# Patient Record
Sex: FEMALE | Race: WHITE | NOT HISPANIC OR LATINO
[De-identification: names, ages, dates, MRNs, and addresses within clinical notes are randomized per-mention and may not be internally consistent; named-entity substitution may affect disease eponyms.]

---

## 2019-03-22 ENCOUNTER — TRANSCRIPTION ENCOUNTER (OUTPATIENT)
Age: 29
End: 2019-03-22

## 2021-06-17 ENCOUNTER — RESULT REVIEW (OUTPATIENT)
Age: 31
End: 2021-06-17

## 2021-09-23 ENCOUNTER — APPOINTMENT (OUTPATIENT)
Dept: ANTEPARTUM | Facility: CLINIC | Age: 31
End: 2021-09-23
Payer: COMMERCIAL

## 2021-09-23 ENCOUNTER — ASOB RESULT (OUTPATIENT)
Age: 31
End: 2021-09-23

## 2021-09-23 PROCEDURE — 76805 OB US >/= 14 WKS SNGL FETUS: CPT

## 2021-12-01 ENCOUNTER — APPOINTMENT (OUTPATIENT)
Dept: ANTEPARTUM | Facility: CLINIC | Age: 31
End: 2021-12-01

## 2022-01-05 ENCOUNTER — TRANSCRIPTION ENCOUNTER (OUTPATIENT)
Age: 32
End: 2022-01-05

## 2022-01-05 ENCOUNTER — INPATIENT (INPATIENT)
Facility: HOSPITAL | Age: 32
LOS: 0 days | Discharge: LEFT BEFORE DISCHARGE | End: 2022-01-05
Attending: SPECIALIST | Admitting: SPECIALIST

## 2022-01-05 VITALS
DIASTOLIC BLOOD PRESSURE: 85 MMHG | TEMPERATURE: 98 F | HEART RATE: 84 BPM | SYSTOLIC BLOOD PRESSURE: 133 MMHG | RESPIRATION RATE: 16 BRPM

## 2022-01-05 VITALS — DIASTOLIC BLOOD PRESSURE: 75 MMHG | SYSTOLIC BLOOD PRESSURE: 130 MMHG | HEART RATE: 73 BPM

## 2022-01-05 DIAGNOSIS — O26.899 OTHER SPECIFIED PREGNANCY RELATED CONDITIONS, UNSPECIFIED TRIMESTER: ICD-10-CM

## 2022-01-05 DIAGNOSIS — Z98.1 ARTHRODESIS STATUS: Chronic | ICD-10-CM

## 2022-01-05 DIAGNOSIS — Z98.890 OTHER SPECIFIED POSTPROCEDURAL STATES: Chronic | ICD-10-CM

## 2022-01-05 DIAGNOSIS — Z3A.00 WEEKS OF GESTATION OF PREGNANCY NOT SPECIFIED: ICD-10-CM

## 2022-01-05 LAB
ALBUMIN SERPL ELPH-MCNC: 3.5 G/DL — SIGNIFICANT CHANGE UP (ref 3.3–5)
ALP SERPL-CCNC: 159 U/L — HIGH (ref 40–120)
ALT FLD-CCNC: 16 U/L — SIGNIFICANT CHANGE UP (ref 4–33)
ANION GAP SERPL CALC-SCNC: 12 MMOL/L — SIGNIFICANT CHANGE UP (ref 7–14)
APPEARANCE UR: CLEAR — SIGNIFICANT CHANGE UP
APTT BLD: 24.2 SEC — LOW (ref 27–36.3)
AST SERPL-CCNC: 36 U/L — HIGH (ref 4–32)
BACTERIA # UR AUTO: ABNORMAL
BASOPHILS # BLD AUTO: 0.02 K/UL — SIGNIFICANT CHANGE UP (ref 0–0.2)
BASOPHILS NFR BLD AUTO: 0.2 % — SIGNIFICANT CHANGE UP (ref 0–2)
BILIRUB SERPL-MCNC: <0.2 MG/DL — SIGNIFICANT CHANGE UP (ref 0.2–1.2)
BILIRUB UR-MCNC: NEGATIVE — SIGNIFICANT CHANGE UP
BUN SERPL-MCNC: 9 MG/DL — SIGNIFICANT CHANGE UP (ref 7–23)
CALCIUM SERPL-MCNC: 9.2 MG/DL — SIGNIFICANT CHANGE UP (ref 8.4–10.5)
CHLORIDE SERPL-SCNC: 105 MMOL/L — SIGNIFICANT CHANGE UP (ref 98–107)
CO2 SERPL-SCNC: 21 MMOL/L — LOW (ref 22–31)
COLOR SPEC: YELLOW — SIGNIFICANT CHANGE UP
CREAT ?TM UR-MCNC: 59 MG/DL — SIGNIFICANT CHANGE UP
CREAT SERPL-MCNC: 0.61 MG/DL — SIGNIFICANT CHANGE UP (ref 0.5–1.3)
DIFF PNL FLD: NEGATIVE — SIGNIFICANT CHANGE UP
EOSINOPHIL # BLD AUTO: 0.06 K/UL — SIGNIFICANT CHANGE UP (ref 0–0.5)
EOSINOPHIL NFR BLD AUTO: 0.6 % — SIGNIFICANT CHANGE UP (ref 0–6)
EPI CELLS # UR: 4 /HPF — SIGNIFICANT CHANGE UP (ref 0–5)
FIBRINOGEN PPP-MCNC: 526 MG/DL — HIGH (ref 290–520)
GLUCOSE SERPL-MCNC: 76 MG/DL — SIGNIFICANT CHANGE UP (ref 70–99)
GLUCOSE UR QL: NEGATIVE — SIGNIFICANT CHANGE UP
HCT VFR BLD CALC: 35.3 % — SIGNIFICANT CHANGE UP (ref 34.5–45)
HGB BLD-MCNC: 12.2 G/DL — SIGNIFICANT CHANGE UP (ref 11.5–15.5)
IANC: 8.19 K/UL — SIGNIFICANT CHANGE UP (ref 1.5–8.5)
IMM GRANULOCYTES NFR BLD AUTO: 0.7 % — SIGNIFICANT CHANGE UP (ref 0–1.5)
INR BLD: 0.93 RATIO — SIGNIFICANT CHANGE UP (ref 0.88–1.16)
KETONES UR-MCNC: NEGATIVE — SIGNIFICANT CHANGE UP
LDH SERPL L TO P-CCNC: 452 U/L — HIGH (ref 135–225)
LEUKOCYTE ESTERASE UR-ACNC: ABNORMAL
LYMPHOCYTES # BLD AUTO: 0.68 K/UL — LOW (ref 1–3.3)
LYMPHOCYTES # BLD AUTO: 6.9 % — LOW (ref 13–44)
MCHC RBC-ENTMCNC: 32.4 PG — SIGNIFICANT CHANGE UP (ref 27–34)
MCHC RBC-ENTMCNC: 34.6 GM/DL — SIGNIFICANT CHANGE UP (ref 32–36)
MCV RBC AUTO: 93.6 FL — SIGNIFICANT CHANGE UP (ref 80–100)
MONOCYTES # BLD AUTO: 0.8 K/UL — SIGNIFICANT CHANGE UP (ref 0–0.9)
MONOCYTES NFR BLD AUTO: 8.1 % — SIGNIFICANT CHANGE UP (ref 2–14)
NEUTROPHILS # BLD AUTO: 8.19 K/UL — HIGH (ref 1.8–7.4)
NEUTROPHILS NFR BLD AUTO: 83.5 % — HIGH (ref 43–77)
NITRITE UR-MCNC: NEGATIVE — SIGNIFICANT CHANGE UP
NRBC # BLD: 0 /100 WBCS — SIGNIFICANT CHANGE UP
NRBC # FLD: 0 K/UL — SIGNIFICANT CHANGE UP
PH UR: 6.5 — SIGNIFICANT CHANGE UP (ref 5–8)
PLATELET # BLD AUTO: 142 K/UL — LOW (ref 150–400)
POTASSIUM SERPL-MCNC: 4.9 MMOL/L — SIGNIFICANT CHANGE UP (ref 3.5–5.3)
POTASSIUM SERPL-SCNC: 4.9 MMOL/L — SIGNIFICANT CHANGE UP (ref 3.5–5.3)
PROT ?TM UR-MCNC: 10 MG/DL — SIGNIFICANT CHANGE UP
PROT ?TM UR-MCNC: 10 MG/DL — SIGNIFICANT CHANGE UP
PROT SERPL-MCNC: 6.3 G/DL — SIGNIFICANT CHANGE UP (ref 6–8.3)
PROT UR-MCNC: NEGATIVE — SIGNIFICANT CHANGE UP
PROT/CREAT UR-RTO: 0.2 RATIO — SIGNIFICANT CHANGE UP (ref 0–0.2)
PROTHROM AB SERPL-ACNC: 10.7 SEC — SIGNIFICANT CHANGE UP (ref 10.6–13.6)
RBC # BLD: 3.77 M/UL — LOW (ref 3.8–5.2)
RBC # FLD: 13.2 % — SIGNIFICANT CHANGE UP (ref 10.3–14.5)
RBC CASTS # UR COMP ASSIST: 1 /HPF — SIGNIFICANT CHANGE UP (ref 0–4)
SODIUM SERPL-SCNC: 138 MMOL/L — SIGNIFICANT CHANGE UP (ref 135–145)
SP GR SPEC: 1.02 — SIGNIFICANT CHANGE UP (ref 1–1.05)
URATE SERPL-MCNC: 5.9 MG/DL — SIGNIFICANT CHANGE UP (ref 2.5–7)
UROBILINOGEN FLD QL: SIGNIFICANT CHANGE UP
WBC # BLD: 9.82 K/UL — SIGNIFICANT CHANGE UP (ref 3.8–10.5)
WBC # FLD AUTO: 9.82 K/UL — SIGNIFICANT CHANGE UP (ref 3.8–10.5)
WBC UR QL: 8 /HPF — HIGH (ref 0–5)

## 2022-01-05 NOTE — OB RN TRIAGE NOTE - NSICDXPASTSURGICALHX_GEN_ALL_CORE_FT
PAST SURGICAL HISTORY:  H/O spinal fusion 2004 T5-L3 due to scoliosis    History of bunionectomy 2011 bilateral

## 2022-01-05 NOTE — OB PROVIDER TRIAGE NOTE - NSHPPHYSICALEXAM_GEN_ALL_CORE
BP when pt first arrived - 152/97 and 154/97 (within 1st 4 minutes of arrival) then pt with [id number]'s/70-80's    CX - deferred

## 2022-01-05 NOTE — OB RN TRIAGE NOTE - FALL HARM RISK - UNIVERSAL INTERVENTIONS
Bed in lowest position, wheels locked, appropriate side rails in place/Call bell, personal items and telephone in reach/Instruct patient to call for assistance before getting out of bed or chair/Non-slip footwear when patient is out of bed/[place] to call system/Physically safe environment - no spills, clutter or unnecessary equipment/Purposeful Proactive Rounding/Room/bathroom lighting operational, light cord in reach

## 2022-01-05 NOTE — OB PROVIDER TRIAGE NOTE - NSHPLABSRESULTS_GEN_ALL_CORE
12.2   9.82  )-----------( 142      ( 05 Jan 2022 16:34 )             35.3     01-05    138  |  105  |  9   ----------------------------<  76  4.9   |  21<L>  |  0.61    Ca    9.2      05 Jan 2022 16:34    TPro  6.3  /  Alb  3.5  /  TBili  <0.2  /  DBili  x   /  AST  36<H>  /  ALT  16  /  AlkPhos  159<H>  01-05    P/C ratio - 0.2

## 2022-01-05 NOTE — OB PROVIDER TRIAGE NOTE - NSOBPROVIDERNOTE_OBGYN_ALL_OB_FT
Pt initially had higH BP and pt was about to go home at 3 hour eneida but decision made to do BP monitoring for one more hour.  During hour #3 to #4 pt with increased BP to 150/95; 142/80l 145/84.    Decision was made to admit pt for Induction.  However pt did not wish to stay for Induction as her  just tested positive for Covid yesterday and pt felt better.      FHT - 130's; +variability; Overall FHT Category I    A/P IUP at 37+ weeks with preeclampsia with severe features (due to HA now resolved).  Pt does not wish to stay for induction.  Pt wishes to sign out AMA.    Pt understands risks of signing out including but not limited to bleeding, abruption, seizures, fetal compromise, fetal death and even maternal death.  Pt has verbalized understanding of the above and has signed AMA form.      Pt to do 24 urine as outpt and F/U with PMD next Tuesday.  Labor precautions and pre-eclamptic precautions given   Pt verbalized understanding of the above.  Shavon Choi

## 2022-01-05 NOTE — OB PROVIDER TRIAGE NOTE - HISTORY OF PRESENT ILLNESS
32 y/o  EGA 37+ weeks wtih c/o increased BP in office and also HA.  Pt with of chronic HA.  No BV or LOF. +GoodFM    PNC with PMD(Delfina) with no complcations as per pt.  Pt states she has been getting HAs during the length of the pregnancy (and before)    PMH - not sig  PSH - spinal fusion; bilateral bunionectomy  POb- not sig  Meds - PNV  SH - neg  x 3  All - Chlorhexadine - rash/swelling

## 2022-01-06 DIAGNOSIS — O16.9 UNSPECIFIED MATERNAL HYPERTENSION, UNSPECIFIED TRIMESTER: ICD-10-CM

## 2022-01-08 ENCOUNTER — INPATIENT (INPATIENT)
Facility: HOSPITAL | Age: 32
LOS: 5 days | Discharge: HOME | End: 2022-01-14
Attending: SPECIALIST | Admitting: SPECIALIST

## 2022-01-08 DIAGNOSIS — Z3A.00 WEEKS OF GESTATION OF PREGNANCY NOT SPECIFIED: ICD-10-CM

## 2022-01-08 DIAGNOSIS — Z98.890 OTHER SPECIFIED POSTPROCEDURAL STATES: Chronic | ICD-10-CM

## 2022-01-08 DIAGNOSIS — Z98.1 ARTHRODESIS STATUS: Chronic | ICD-10-CM

## 2022-01-08 DIAGNOSIS — O26.899 OTHER SPECIFIED PREGNANCY RELATED CONDITIONS, UNSPECIFIED TRIMESTER: ICD-10-CM

## 2022-01-09 VITALS — DIASTOLIC BLOOD PRESSURE: 91 MMHG | TEMPERATURE: 98 F | SYSTOLIC BLOOD PRESSURE: 150 MMHG | HEART RATE: 63 BPM

## 2022-01-09 DIAGNOSIS — O14.13 SEVERE PRE-ECLAMPSIA, THIRD TRIMESTER: ICD-10-CM

## 2022-01-09 LAB
ALBUMIN SERPL ELPH-MCNC: 3.2 G/DL — LOW (ref 3.3–5)
ALBUMIN SERPL ELPH-MCNC: 3.3 G/DL — SIGNIFICANT CHANGE UP (ref 3.3–5)
ALBUMIN SERPL ELPH-MCNC: 3.4 G/DL — SIGNIFICANT CHANGE UP (ref 3.3–5)
ALBUMIN SERPL ELPH-MCNC: 3.4 G/DL — SIGNIFICANT CHANGE UP (ref 3.3–5)
ALP SERPL-CCNC: 160 U/L — HIGH (ref 40–120)
ALP SERPL-CCNC: 165 U/L — HIGH (ref 40–120)
ALP SERPL-CCNC: 168 U/L — HIGH (ref 40–120)
ALP SERPL-CCNC: 187 U/L — HIGH (ref 40–120)
ALT FLD-CCNC: 12 U/L — SIGNIFICANT CHANGE UP (ref 4–33)
ALT FLD-CCNC: 13 U/L — SIGNIFICANT CHANGE UP (ref 4–33)
ALT FLD-CCNC: 15 U/L — SIGNIFICANT CHANGE UP (ref 4–33)
ALT FLD-CCNC: 16 U/L — SIGNIFICANT CHANGE UP (ref 4–33)
ANION GAP SERPL CALC-SCNC: 12 MMOL/L — SIGNIFICANT CHANGE UP (ref 7–14)
ANION GAP SERPL CALC-SCNC: 13 MMOL/L — SIGNIFICANT CHANGE UP (ref 7–14)
ANION GAP SERPL CALC-SCNC: 14 MMOL/L — SIGNIFICANT CHANGE UP (ref 7–14)
ANION GAP SERPL CALC-SCNC: 9 MMOL/L — SIGNIFICANT CHANGE UP (ref 7–14)
APPEARANCE UR: ABNORMAL
APTT BLD: 29.9 SEC — SIGNIFICANT CHANGE UP (ref 27–36.3)
APTT BLD: 30.1 SEC — SIGNIFICANT CHANGE UP (ref 27–36.3)
APTT BLD: 31 SEC — SIGNIFICANT CHANGE UP (ref 27–36.3)
APTT BLD: 31.9 SEC — SIGNIFICANT CHANGE UP (ref 27–36.3)
AST SERPL-CCNC: 21 U/L — SIGNIFICANT CHANGE UP (ref 4–32)
AST SERPL-CCNC: 21 U/L — SIGNIFICANT CHANGE UP (ref 4–32)
AST SERPL-CCNC: 24 U/L — SIGNIFICANT CHANGE UP (ref 4–32)
AST SERPL-CCNC: 25 U/L — SIGNIFICANT CHANGE UP (ref 4–32)
BACTERIA # UR AUTO: ABNORMAL
BASOPHILS # BLD AUTO: 0.02 K/UL — SIGNIFICANT CHANGE UP (ref 0–0.2)
BASOPHILS # BLD AUTO: 0.03 K/UL — SIGNIFICANT CHANGE UP (ref 0–0.2)
BASOPHILS NFR BLD AUTO: 0.2 % — SIGNIFICANT CHANGE UP (ref 0–2)
BASOPHILS NFR BLD AUTO: 0.3 % — SIGNIFICANT CHANGE UP (ref 0–2)
BILIRUB SERPL-MCNC: <0.2 MG/DL — SIGNIFICANT CHANGE UP (ref 0.2–1.2)
BILIRUB UR-MCNC: NEGATIVE — SIGNIFICANT CHANGE UP
BLD GP AB SCN SERPL QL: NEGATIVE — SIGNIFICANT CHANGE UP
BUN SERPL-MCNC: 10 MG/DL — SIGNIFICANT CHANGE UP (ref 7–23)
BUN SERPL-MCNC: 5 MG/DL — LOW (ref 7–23)
BUN SERPL-MCNC: 8 MG/DL — SIGNIFICANT CHANGE UP (ref 7–23)
BUN SERPL-MCNC: 9 MG/DL — SIGNIFICANT CHANGE UP (ref 7–23)
CALCIUM SERPL-MCNC: 7.8 MG/DL — LOW (ref 8.4–10.5)
CALCIUM SERPL-MCNC: 7.8 MG/DL — LOW (ref 8.4–10.5)
CALCIUM SERPL-MCNC: 8.4 MG/DL — SIGNIFICANT CHANGE UP (ref 8.4–10.5)
CALCIUM SERPL-MCNC: 9.2 MG/DL — SIGNIFICANT CHANGE UP (ref 8.4–10.5)
CHLORIDE SERPL-SCNC: 101 MMOL/L — SIGNIFICANT CHANGE UP (ref 98–107)
CHLORIDE SERPL-SCNC: 102 MMOL/L — SIGNIFICANT CHANGE UP (ref 98–107)
CHLORIDE SERPL-SCNC: 103 MMOL/L — SIGNIFICANT CHANGE UP (ref 98–107)
CHLORIDE SERPL-SCNC: 104 MMOL/L — SIGNIFICANT CHANGE UP (ref 98–107)
CO2 SERPL-SCNC: 21 MMOL/L — LOW (ref 22–31)
CO2 SERPL-SCNC: 22 MMOL/L — SIGNIFICANT CHANGE UP (ref 22–31)
CO2 SERPL-SCNC: 23 MMOL/L — SIGNIFICANT CHANGE UP (ref 22–31)
CO2 SERPL-SCNC: 23 MMOL/L — SIGNIFICANT CHANGE UP (ref 22–31)
COLOR SPEC: YELLOW — SIGNIFICANT CHANGE UP
COVID-19 SPIKE DOMAIN AB INTERP: POSITIVE
COVID-19 SPIKE DOMAIN ANTIBODY RESULT: >250 U/ML — HIGH
CREAT ?TM UR-MCNC: 109 MG/DL — SIGNIFICANT CHANGE UP
CREAT SERPL-MCNC: 0.65 MG/DL — SIGNIFICANT CHANGE UP (ref 0.5–1.3)
CREAT SERPL-MCNC: 0.68 MG/DL — SIGNIFICANT CHANGE UP (ref 0.5–1.3)
CREAT SERPL-MCNC: 0.68 MG/DL — SIGNIFICANT CHANGE UP (ref 0.5–1.3)
CREAT SERPL-MCNC: 0.71 MG/DL — SIGNIFICANT CHANGE UP (ref 0.5–1.3)
DIFF PNL FLD: NEGATIVE — SIGNIFICANT CHANGE UP
EOSINOPHIL # BLD AUTO: 0.05 K/UL — SIGNIFICANT CHANGE UP (ref 0–0.5)
EOSINOPHIL # BLD AUTO: 0.06 K/UL — SIGNIFICANT CHANGE UP (ref 0–0.5)
EOSINOPHIL # BLD AUTO: 0.06 K/UL — SIGNIFICANT CHANGE UP (ref 0–0.5)
EOSINOPHIL # BLD AUTO: 0.1 K/UL — SIGNIFICANT CHANGE UP (ref 0–0.5)
EOSINOPHIL NFR BLD AUTO: 0.6 % — SIGNIFICANT CHANGE UP (ref 0–6)
EOSINOPHIL NFR BLD AUTO: 0.6 % — SIGNIFICANT CHANGE UP (ref 0–6)
EOSINOPHIL NFR BLD AUTO: 0.7 % — SIGNIFICANT CHANGE UP (ref 0–6)
EOSINOPHIL NFR BLD AUTO: 1 % — SIGNIFICANT CHANGE UP (ref 0–6)
EPI CELLS # UR: 5 /HPF — SIGNIFICANT CHANGE UP (ref 0–5)
FIBRINOGEN PPP-MCNC: 506 MG/DL — SIGNIFICANT CHANGE UP (ref 290–520)
FIBRINOGEN PPP-MCNC: 513 MG/DL — SIGNIFICANT CHANGE UP (ref 290–520)
FIBRINOGEN PPP-MCNC: 515 MG/DL — SIGNIFICANT CHANGE UP (ref 290–520)
FIBRINOGEN PPP-MCNC: 523 MG/DL — HIGH (ref 290–520)
GLUCOSE SERPL-MCNC: 106 MG/DL — HIGH (ref 70–99)
GLUCOSE SERPL-MCNC: 138 MG/DL — HIGH (ref 70–99)
GLUCOSE SERPL-MCNC: 84 MG/DL — SIGNIFICANT CHANGE UP (ref 70–99)
GLUCOSE SERPL-MCNC: 96 MG/DL — SIGNIFICANT CHANGE UP (ref 70–99)
GLUCOSE UR QL: NEGATIVE — SIGNIFICANT CHANGE UP
HCT VFR BLD CALC: 35 % — SIGNIFICANT CHANGE UP (ref 34.5–45)
HCT VFR BLD CALC: 35.2 % — SIGNIFICANT CHANGE UP (ref 34.5–45)
HCT VFR BLD CALC: 35.7 % — SIGNIFICANT CHANGE UP (ref 34.5–45)
HCT VFR BLD CALC: 38.3 % — SIGNIFICANT CHANGE UP (ref 34.5–45)
HGB BLD-MCNC: 11.8 G/DL — SIGNIFICANT CHANGE UP (ref 11.5–15.5)
HGB BLD-MCNC: 12 G/DL — SIGNIFICANT CHANGE UP (ref 11.5–15.5)
HGB BLD-MCNC: 12.1 G/DL — SIGNIFICANT CHANGE UP (ref 11.5–15.5)
HGB BLD-MCNC: 12.8 G/DL — SIGNIFICANT CHANGE UP (ref 11.5–15.5)
HYALINE CASTS # UR AUTO: 2 /LPF — SIGNIFICANT CHANGE UP (ref 0–7)
IANC: 6.38 K/UL — SIGNIFICANT CHANGE UP (ref 1.5–8.5)
IANC: 6.69 K/UL — SIGNIFICANT CHANGE UP (ref 1.5–8.5)
IANC: 6.7 K/UL — SIGNIFICANT CHANGE UP (ref 1.5–8.5)
IANC: 6.71 K/UL — SIGNIFICANT CHANGE UP (ref 1.5–8.5)
IMM GRANULOCYTES NFR BLD AUTO: 0.3 % — SIGNIFICANT CHANGE UP (ref 0–1.5)
IMM GRANULOCYTES NFR BLD AUTO: 0.4 % — SIGNIFICANT CHANGE UP (ref 0–1.5)
IMM GRANULOCYTES NFR BLD AUTO: 0.6 % — SIGNIFICANT CHANGE UP (ref 0–1.5)
IMM GRANULOCYTES NFR BLD AUTO: 0.7 % — SIGNIFICANT CHANGE UP (ref 0–1.5)
INR BLD: 0.9 RATIO — SIGNIFICANT CHANGE UP (ref 0.88–1.16)
INR BLD: 0.91 RATIO — SIGNIFICANT CHANGE UP (ref 0.88–1.16)
INR BLD: 0.94 RATIO — SIGNIFICANT CHANGE UP (ref 0.88–1.16)
INR BLD: <0.9 RATIO — SIGNIFICANT CHANGE UP (ref 0.88–1.16)
KETONES UR-MCNC: NEGATIVE — SIGNIFICANT CHANGE UP
LDH SERPL L TO P-CCNC: 210 U/L — SIGNIFICANT CHANGE UP (ref 135–225)
LDH SERPL L TO P-CCNC: 212 U/L — SIGNIFICANT CHANGE UP (ref 135–225)
LDH SERPL L TO P-CCNC: 231 U/L — HIGH (ref 135–225)
LDH SERPL L TO P-CCNC: 253 U/L — HIGH (ref 135–225)
LEUKOCYTE ESTERASE UR-ACNC: ABNORMAL
LYMPHOCYTES # BLD AUTO: 1.58 K/UL — SIGNIFICANT CHANGE UP (ref 1–3.3)
LYMPHOCYTES # BLD AUTO: 1.66 K/UL — SIGNIFICANT CHANGE UP (ref 1–3.3)
LYMPHOCYTES # BLD AUTO: 17.7 % — SIGNIFICANT CHANGE UP (ref 13–44)
LYMPHOCYTES # BLD AUTO: 18.7 % — SIGNIFICANT CHANGE UP (ref 13–44)
LYMPHOCYTES # BLD AUTO: 2.09 K/UL — SIGNIFICANT CHANGE UP (ref 1–3.3)
LYMPHOCYTES # BLD AUTO: 2.17 K/UL — SIGNIFICANT CHANGE UP (ref 1–3.3)
LYMPHOCYTES # BLD AUTO: 21.6 % — SIGNIFICANT CHANGE UP (ref 13–44)
LYMPHOCYTES # BLD AUTO: 22.8 % — SIGNIFICANT CHANGE UP (ref 13–44)
MAGNESIUM SERPL-MCNC: 4.4 MG/DL — HIGH (ref 1.6–2.6)
MAGNESIUM SERPL-MCNC: 5.2 MG/DL — HIGH (ref 1.6–2.6)
MAGNESIUM SERPL-MCNC: 5.6 MG/DL — HIGH (ref 1.6–2.6)
MCHC RBC-ENTMCNC: 31.2 PG — SIGNIFICANT CHANGE UP (ref 27–34)
MCHC RBC-ENTMCNC: 31.2 PG — SIGNIFICANT CHANGE UP (ref 27–34)
MCHC RBC-ENTMCNC: 31.6 PG — SIGNIFICANT CHANGE UP (ref 27–34)
MCHC RBC-ENTMCNC: 32.1 PG — SIGNIFICANT CHANGE UP (ref 27–34)
MCHC RBC-ENTMCNC: 33.4 GM/DL — SIGNIFICANT CHANGE UP (ref 32–36)
MCHC RBC-ENTMCNC: 33.6 GM/DL — SIGNIFICANT CHANGE UP (ref 32–36)
MCHC RBC-ENTMCNC: 33.7 GM/DL — SIGNIFICANT CHANGE UP (ref 32–36)
MCHC RBC-ENTMCNC: 34.4 GM/DL — SIGNIFICANT CHANGE UP (ref 32–36)
MCV RBC AUTO: 92.6 FL — SIGNIFICANT CHANGE UP (ref 80–100)
MCV RBC AUTO: 92.7 FL — SIGNIFICANT CHANGE UP (ref 80–100)
MCV RBC AUTO: 93.4 FL — SIGNIFICANT CHANGE UP (ref 80–100)
MCV RBC AUTO: 94.6 FL — SIGNIFICANT CHANGE UP (ref 80–100)
MONOCYTES # BLD AUTO: 0.39 K/UL — SIGNIFICANT CHANGE UP (ref 0–0.9)
MONOCYTES # BLD AUTO: 0.55 K/UL — SIGNIFICANT CHANGE UP (ref 0–0.9)
MONOCYTES # BLD AUTO: 0.77 K/UL — SIGNIFICANT CHANGE UP (ref 0–0.9)
MONOCYTES # BLD AUTO: 0.79 K/UL — SIGNIFICANT CHANGE UP (ref 0–0.9)
MONOCYTES NFR BLD AUTO: 4.4 % — SIGNIFICANT CHANGE UP (ref 2–14)
MONOCYTES NFR BLD AUTO: 6.1 % — SIGNIFICANT CHANGE UP (ref 2–14)
MONOCYTES NFR BLD AUTO: 8 % — SIGNIFICANT CHANGE UP (ref 2–14)
MONOCYTES NFR BLD AUTO: 8.3 % — SIGNIFICANT CHANGE UP (ref 2–14)
NEUTROPHILS # BLD AUTO: 6.38 K/UL — SIGNIFICANT CHANGE UP (ref 1.8–7.4)
NEUTROPHILS # BLD AUTO: 6.69 K/UL — SIGNIFICANT CHANGE UP (ref 1.8–7.4)
NEUTROPHILS # BLD AUTO: 6.7 K/UL — SIGNIFICANT CHANGE UP (ref 1.8–7.4)
NEUTROPHILS # BLD AUTO: 6.71 K/UL — SIGNIFICANT CHANGE UP (ref 1.8–7.4)
NEUTROPHILS NFR BLD AUTO: 67 % — SIGNIFICANT CHANGE UP (ref 43–77)
NEUTROPHILS NFR BLD AUTO: 69.3 % — SIGNIFICANT CHANGE UP (ref 43–77)
NEUTROPHILS NFR BLD AUTO: 75 % — SIGNIFICANT CHANGE UP (ref 43–77)
NEUTROPHILS NFR BLD AUTO: 75.3 % — SIGNIFICANT CHANGE UP (ref 43–77)
NITRITE UR-MCNC: NEGATIVE — SIGNIFICANT CHANGE UP
NRBC # BLD: 0 /100 WBCS — SIGNIFICANT CHANGE UP
NRBC # FLD: 0 K/UL — SIGNIFICANT CHANGE UP
PH UR: 6 — SIGNIFICANT CHANGE UP (ref 5–8)
PLATELET # BLD AUTO: 163 K/UL — SIGNIFICANT CHANGE UP (ref 150–400)
PLATELET # BLD AUTO: 176 K/UL — SIGNIFICANT CHANGE UP (ref 150–400)
PLATELET # BLD AUTO: 179 K/UL — SIGNIFICANT CHANGE UP (ref 150–400)
PLATELET # BLD AUTO: 190 K/UL — SIGNIFICANT CHANGE UP (ref 150–400)
POTASSIUM SERPL-MCNC: 3.5 MMOL/L — SIGNIFICANT CHANGE UP (ref 3.5–5.3)
POTASSIUM SERPL-MCNC: 3.7 MMOL/L — SIGNIFICANT CHANGE UP (ref 3.5–5.3)
POTASSIUM SERPL-MCNC: 3.8 MMOL/L — SIGNIFICANT CHANGE UP (ref 3.5–5.3)
POTASSIUM SERPL-MCNC: 3.8 MMOL/L — SIGNIFICANT CHANGE UP (ref 3.5–5.3)
POTASSIUM SERPL-SCNC: 3.5 MMOL/L — SIGNIFICANT CHANGE UP (ref 3.5–5.3)
POTASSIUM SERPL-SCNC: 3.7 MMOL/L — SIGNIFICANT CHANGE UP (ref 3.5–5.3)
POTASSIUM SERPL-SCNC: 3.8 MMOL/L — SIGNIFICANT CHANGE UP (ref 3.5–5.3)
POTASSIUM SERPL-SCNC: 3.8 MMOL/L — SIGNIFICANT CHANGE UP (ref 3.5–5.3)
PROT ?TM UR-MCNC: 22 MG/DL — SIGNIFICANT CHANGE UP
PROT ?TM UR-MCNC: 22 MG/DL — SIGNIFICANT CHANGE UP
PROT SERPL-MCNC: 6 G/DL — SIGNIFICANT CHANGE UP (ref 6–8.3)
PROT SERPL-MCNC: 6 G/DL — SIGNIFICANT CHANGE UP (ref 6–8.3)
PROT SERPL-MCNC: 6.2 G/DL — SIGNIFICANT CHANGE UP (ref 6–8.3)
PROT SERPL-MCNC: 6.2 G/DL — SIGNIFICANT CHANGE UP (ref 6–8.3)
PROT UR-MCNC: ABNORMAL
PROT/CREAT UR-RTO: 0.2 RATIO — SIGNIFICANT CHANGE UP (ref 0–0.2)
PROTHROM AB SERPL-ACNC: 10.2 SEC — LOW (ref 10.6–13.6)
PROTHROM AB SERPL-ACNC: 10.3 SEC — LOW (ref 10.6–13.6)
PROTHROM AB SERPL-ACNC: 10.4 SEC — LOW (ref 10.6–13.6)
PROTHROM AB SERPL-ACNC: 10.8 SEC — SIGNIFICANT CHANGE UP (ref 10.6–13.6)
RBC # BLD: 3.77 M/UL — LOW (ref 3.8–5.2)
RBC # BLD: 3.78 M/UL — LOW (ref 3.8–5.2)
RBC # BLD: 3.85 M/UL — SIGNIFICANT CHANGE UP (ref 3.8–5.2)
RBC # BLD: 4.05 M/UL — SIGNIFICANT CHANGE UP (ref 3.8–5.2)
RBC # FLD: 13.5 % — SIGNIFICANT CHANGE UP (ref 10.3–14.5)
RBC # FLD: 13.5 % — SIGNIFICANT CHANGE UP (ref 10.3–14.5)
RBC # FLD: 13.7 % — SIGNIFICANT CHANGE UP (ref 10.3–14.5)
RBC # FLD: 13.7 % — SIGNIFICANT CHANGE UP (ref 10.3–14.5)
RBC CASTS # UR COMP ASSIST: <5 /HPF — SIGNIFICANT CHANGE UP (ref 0–4)
RH IG SCN BLD-IMP: POSITIVE — SIGNIFICANT CHANGE UP
RH IG SCN BLD-IMP: POSITIVE — SIGNIFICANT CHANGE UP
SARS-COV-2 IGG+IGM SERPL QL IA: >250 U/ML — HIGH
SARS-COV-2 IGG+IGM SERPL QL IA: POSITIVE
SARS-COV-2 RNA SPEC QL NAA+PROBE: DETECTED
SODIUM SERPL-SCNC: 135 MMOL/L — SIGNIFICANT CHANGE UP (ref 135–145)
SODIUM SERPL-SCNC: 136 MMOL/L — SIGNIFICANT CHANGE UP (ref 135–145)
SODIUM SERPL-SCNC: 137 MMOL/L — SIGNIFICANT CHANGE UP (ref 135–145)
SODIUM SERPL-SCNC: 139 MMOL/L — SIGNIFICANT CHANGE UP (ref 135–145)
SP GR SPEC: 1.02 — SIGNIFICANT CHANGE UP (ref 1–1.05)
URATE SERPL-MCNC: 5.8 MG/DL — SIGNIFICANT CHANGE UP (ref 2.5–7)
URATE SERPL-MCNC: 6 MG/DL — SIGNIFICANT CHANGE UP (ref 2.5–7)
URATE SERPL-MCNC: 6.1 MG/DL — SIGNIFICANT CHANGE UP (ref 2.5–7)
URATE SERPL-MCNC: 6.4 MG/DL — SIGNIFICANT CHANGE UP (ref 2.5–7)
UROBILINOGEN FLD QL: SIGNIFICANT CHANGE UP
WBC # BLD: 8.89 K/UL — SIGNIFICANT CHANGE UP (ref 3.8–10.5)
WBC # BLD: 8.95 K/UL — SIGNIFICANT CHANGE UP (ref 3.8–10.5)
WBC # BLD: 9.53 K/UL — SIGNIFICANT CHANGE UP (ref 3.8–10.5)
WBC # BLD: 9.66 K/UL — SIGNIFICANT CHANGE UP (ref 3.8–10.5)
WBC # FLD AUTO: 8.89 K/UL — SIGNIFICANT CHANGE UP (ref 3.8–10.5)
WBC # FLD AUTO: 8.95 K/UL — SIGNIFICANT CHANGE UP (ref 3.8–10.5)
WBC # FLD AUTO: 9.53 K/UL — SIGNIFICANT CHANGE UP (ref 3.8–10.5)
WBC # FLD AUTO: 9.66 K/UL — SIGNIFICANT CHANGE UP (ref 3.8–10.5)
WBC UR QL: >50 /HPF — SIGNIFICANT CHANGE UP (ref 0–5)

## 2022-01-09 RX ORDER — AMPICILLIN TRIHYDRATE 250 MG
1 CAPSULE ORAL EVERY 4 HOURS
Refills: 0 | Status: DISCONTINUED | OUTPATIENT
Start: 2022-01-09 | End: 2022-01-12

## 2022-01-09 RX ORDER — OXYTOCIN 10 UNIT/ML
333.33 VIAL (ML) INJECTION
Qty: 20 | Refills: 0 | Status: DISCONTINUED | OUTPATIENT
Start: 2022-01-09 | End: 2022-01-13

## 2022-01-09 RX ORDER — ACETAMINOPHEN 500 MG
0 TABLET ORAL
Qty: 0 | Refills: 0 | DISCHARGE

## 2022-01-09 RX ORDER — ACETAMINOPHEN 500 MG
1000 TABLET ORAL ONCE
Refills: 0 | Status: COMPLETED | OUTPATIENT
Start: 2022-01-09 | End: 2022-01-09

## 2022-01-09 RX ORDER — DIPHENHYDRAMINE HCL 50 MG
25 CAPSULE ORAL ONCE
Refills: 0 | Status: DISCONTINUED | OUTPATIENT
Start: 2022-01-09 | End: 2022-01-12

## 2022-01-09 RX ORDER — MAGNESIUM SULFATE 500 MG/ML
4 VIAL (ML) INJECTION ONCE
Refills: 0 | Status: COMPLETED | OUTPATIENT
Start: 2022-01-09 | End: 2022-01-09

## 2022-01-09 RX ORDER — MAGNESIUM SULFATE 500 MG/ML
4 VIAL (ML) INJECTION ONCE
Refills: 0 | Status: DISCONTINUED | OUTPATIENT
Start: 2022-01-09 | End: 2022-01-09

## 2022-01-09 RX ORDER — DIPHENHYDRAMINE HCL 50 MG
25 CAPSULE ORAL ONCE
Refills: 0 | Status: COMPLETED | OUTPATIENT
Start: 2022-01-09 | End: 2022-01-09

## 2022-01-09 RX ORDER — AMPICILLIN TRIHYDRATE 250 MG
2 CAPSULE ORAL ONCE
Refills: 0 | Status: COMPLETED | OUTPATIENT
Start: 2022-01-09 | End: 2022-01-09

## 2022-01-09 RX ORDER — METOCLOPRAMIDE HCL 10 MG
10 TABLET ORAL ONCE
Refills: 0 | Status: COMPLETED | OUTPATIENT
Start: 2022-01-09 | End: 2022-01-09

## 2022-01-09 RX ORDER — SODIUM CHLORIDE 9 MG/ML
1000 INJECTION, SOLUTION INTRAVENOUS
Refills: 0 | Status: DISCONTINUED | OUTPATIENT
Start: 2022-01-09 | End: 2022-01-12

## 2022-01-09 RX ORDER — MAGNESIUM SULFATE 500 MG/ML
2 VIAL (ML) INJECTION
Qty: 40 | Refills: 0 | Status: DISCONTINUED | OUTPATIENT
Start: 2022-01-09 | End: 2022-01-09

## 2022-01-09 RX ORDER — HYDRALAZINE HCL 50 MG
5 TABLET ORAL ONCE
Refills: 0 | Status: COMPLETED | OUTPATIENT
Start: 2022-01-09 | End: 2022-01-09

## 2022-01-09 RX ORDER — BENZOCAINE AND MENTHOL 5; 1 G/100ML; G/100ML
1 LIQUID ORAL
Refills: 0 | Status: DISCONTINUED | OUTPATIENT
Start: 2022-01-09 | End: 2022-01-12

## 2022-01-09 RX ORDER — MAGNESIUM SULFATE 500 MG/ML
2 VIAL (ML) INJECTION
Qty: 40 | Refills: 0 | Status: COMPLETED | OUTPATIENT
Start: 2022-01-09 | End: 2022-01-10

## 2022-01-09 RX ADMIN — Medication 216 GRAM(S): at 05:03

## 2022-01-09 RX ADMIN — Medication 108 GRAM(S): at 13:08

## 2022-01-09 RX ADMIN — Medication 50 GM/HR: at 19:14

## 2022-01-09 RX ADMIN — Medication 10 MILLIGRAM(S): at 16:03

## 2022-01-09 RX ADMIN — Medication 400 MILLIGRAM(S): at 14:08

## 2022-01-09 RX ADMIN — Medication 50 GM/HR: at 02:36

## 2022-01-09 RX ADMIN — Medication 108 GRAM(S): at 09:10

## 2022-01-09 RX ADMIN — Medication 108 GRAM(S): at 21:00

## 2022-01-09 RX ADMIN — Medication 300 GRAM(S): at 02:07

## 2022-01-09 RX ADMIN — Medication 5 MILLIGRAM(S): at 01:50

## 2022-01-09 RX ADMIN — Medication 108 GRAM(S): at 17:12

## 2022-01-09 RX ADMIN — Medication 25 MILLIGRAM(S): at 16:03

## 2022-01-09 RX ADMIN — Medication 1000 MILLIGRAM(S): at 14:24

## 2022-01-09 NOTE — OB PROVIDER H&P - NSHPPHYSICALEXAM_GEN_ALL_CORE
Vital Signs Last 24 Hrs  T(C): 36.8 (09 Jan 2022 00:51), Max: 36.8 (09 Jan 2022 00:50)  T(F): 98.2 (09 Jan 2022 00:51), Max: 98.24 (09 Jan 2022 00:50)  HR: 64 (09 Jan 2022 02:05) (61 - 70)  BP: 137/83 (09 Jan 2022 02:05) (136/84 - 164/91)  BP(mean): --  RR: 16 (09 Jan 2022 00:51) (16 - 16)  SpO2: --    Lungs: Anterior and posterior lung sounds clear upon auscultation  Cardiac: R/R/R  SVE: closed/50/-3  TAS: vertex presentation   EFW 3317gm by leopold's     NST reactive with moderate variability, cat 1  toco no ctx noted

## 2022-01-09 NOTE — OB PROVIDER H&P - ASSESSMENT
0120:  BP-162/91, HR 70   repeat BP- 164/91, HR 69  d/w Dr Licona   Plan:  -STAT 5mg Hydralazine IVP  -will continue to monitor  -Admit for IOL PO cytotec and magnesium sulfate     32 y/o pt 38.3 weeks  presents with PEC without severe features   d/w Dr Agustin and Dr Licona  Plan:  -Admit l&d. Routine labs  -IOL PO cytotec  -PEC labs pending   -Magnesium sulfate for neuro protection  -Procardia 30mg XL daily   -Fetus: cat 1, vertex presentation, continuous monitoring  -Ampicillin for gbs prophylaxis  -Pain: patient denies pain management at this time   -Covid 19 pending for patient and support person  -Consents signed and witnessed at bedside

## 2022-01-09 NOTE — OB PROVIDER LABOR PROGRESS NOTE - ASSESSMENT
-attempt made at placing cervical balloon unsuccessful  -continue IOL w/ cytotec  -reattempt when cervix more favorable  -category 1 tracing  -headache w/ sPEC- given tylenol, reglan, benadryl    d/w Dr. Dougie Nieto PGY3

## 2022-01-09 NOTE — OB PROVIDER H&P - HISTORY OF PRESENT ILLNESS
32 y/o pt 38.3 weeks  with known gestational hypertension was advised to come to triage by primary OB for further evaluation of PEC due to elevated BPs in office and incomplete evaluation on 22 in triage. pt denies any headache, visual disturbances or epigastric pain. pt denies any lof, contractions or bleeding. pt denies n/v/d, fever or chills. pt endorses +Fetal movement   AP complicated by:  -Patient was sent from office for elevated BPs with HA and evaluated in triage on 22; pt recommended to be admitted for IOL for elevated BPs and abnormal labs. pt refused IOL and signed out AMA     NKDA  PMH:   Scoliosis   PSH:  Spinal fusion 04' (T5-L3), as per patient, not cleared by anesthesia for epidural  B/L bunionectomy 11'  OB: denies  GYN:  denies fibroids/cysts/STDs  Social/psych hx:  denies ETOH/smoking/illicit drugs   denies psych hx   Medications:  PNV

## 2022-01-09 NOTE — OB PROVIDER H&P - PROBLEM SELECTOR PLAN 1
-Admit l&d. Routine labs  -IOL PO cytotec  -PEC labs pending   -Magnesium sulfate for neuro protection  -Procardia 30mg XL daily   -Fetus: cat 1, vertex presentation, continuous monitoring  -Ampicillin for gbs prophylaxis  -Pain: patient denies pain management at this time   -Covid 19 pending for patient and support person  -Consents signed and witnessed at bedside

## 2022-01-09 NOTE — OB RN PATIENT PROFILE - AS HEIGHT TYPE
Patient complains of chest pain after waking up this morning.  Patient states pain is on the right side stated

## 2022-01-09 NOTE — OB RN PATIENT PROFILE - FALL HARM RISK - UNIVERSAL INTERVENTIONS
Bed in lowest position, wheels locked, appropriate side rails in place/Call bell, personal items and telephone in reach/Instruct patient to call for assistance before getting out of bed or chair/Non-slip footwear when patient is out of bed/[place] to call system/Physically safe environment - no spills, clutter or unnecessary equipment/Purposeful Proactive Rounding/Room/bathroom lighting operational, light cord in reach

## 2022-01-09 NOTE — OB RN TRIAGE NOTE - FALL HARM RISK - UNIVERSAL INTERVENTIONS
Bed in lowest position, wheels locked, appropriate side rails in place/Call bell, personal items and telephone in reach/Instruct patient to call for assistance before getting out of bed or chair/Non-slip footwear when patient is out of bed/[place] to call system/Physically safe environment - no spills, clutter or unnecessary equipment/Purposeful Proactive Rounding/Room/bathroom lighting operational, light cord in reach

## 2022-01-09 NOTE — OB RN PATIENT PROFILE - PRO HBSAG INFANT
negative Complex Repair And Skin Substitute Graft Text: The defect edges were debeveled with a #15 scalpel blade.  The primary defect was closed partially with a complex linear closure.  Given the location of the remaining defect, shape of the defect and the proximity to free margins a skin substitute graft was deemed most appropriate to repair the remaining defect.  The graft was trimmed to fit the size of the remaining defect.  The graft was then placed in the primary defect, oriented appropriately, and sutured into place.

## 2022-01-09 NOTE — OB RN PATIENT PROFILE - TOBACCO USE
Never smoker Skin Substitute Text: The defect edges were debeveled with a #15 scalpel blade.  Given the location of the defect, shape of the defect and the proximity to free margins a skin substitute graft was deemed most appropriate.  The graft material was trimmed to fit the size of the defect. The graft was then placed in the primary defect and oriented appropriately.

## 2022-01-10 ENCOUNTER — TRANSCRIPTION ENCOUNTER (OUTPATIENT)
Age: 32
End: 2022-01-10

## 2022-01-10 DIAGNOSIS — O16.9 UNSPECIFIED MATERNAL HYPERTENSION, UNSPECIFIED TRIMESTER: ICD-10-CM

## 2022-01-10 LAB
ALBUMIN SERPL ELPH-MCNC: 3.4 G/DL — SIGNIFICANT CHANGE UP (ref 3.3–5)
ALBUMIN SERPL ELPH-MCNC: 3.4 G/DL — SIGNIFICANT CHANGE UP (ref 3.3–5)
ALBUMIN SERPL ELPH-MCNC: 3.6 G/DL — SIGNIFICANT CHANGE UP (ref 3.3–5)
ALBUMIN SERPL ELPH-MCNC: 3.6 G/DL — SIGNIFICANT CHANGE UP (ref 3.3–5)
ALP SERPL-CCNC: 174 U/L — HIGH (ref 40–120)
ALP SERPL-CCNC: 185 U/L — HIGH (ref 40–120)
ALP SERPL-CCNC: 190 U/L — HIGH (ref 40–120)
ALP SERPL-CCNC: 204 U/L — HIGH (ref 40–120)
ALT FLD-CCNC: 13 U/L — SIGNIFICANT CHANGE UP (ref 4–33)
ALT FLD-CCNC: 16 U/L — SIGNIFICANT CHANGE UP (ref 4–33)
ALT FLD-CCNC: 17 U/L — SIGNIFICANT CHANGE UP (ref 4–33)
ALT FLD-CCNC: 22 U/L — SIGNIFICANT CHANGE UP (ref 4–33)
ANION GAP SERPL CALC-SCNC: 10 MMOL/L — SIGNIFICANT CHANGE UP (ref 7–14)
ANION GAP SERPL CALC-SCNC: 11 MMOL/L — SIGNIFICANT CHANGE UP (ref 7–14)
ANION GAP SERPL CALC-SCNC: 12 MMOL/L — SIGNIFICANT CHANGE UP (ref 7–14)
ANION GAP SERPL CALC-SCNC: 13 MMOL/L — SIGNIFICANT CHANGE UP (ref 7–14)
APTT BLD: 31.2 SEC — SIGNIFICANT CHANGE UP (ref 27–36.3)
APTT BLD: 31.2 SEC — SIGNIFICANT CHANGE UP (ref 27–36.3)
APTT BLD: 31.3 SEC — SIGNIFICANT CHANGE UP (ref 27–36.3)
APTT BLD: 31.8 SEC — SIGNIFICANT CHANGE UP (ref 27–36.3)
AST SERPL-CCNC: 22 U/L — SIGNIFICANT CHANGE UP (ref 4–32)
AST SERPL-CCNC: 24 U/L — SIGNIFICANT CHANGE UP (ref 4–32)
AST SERPL-CCNC: 25 U/L — SIGNIFICANT CHANGE UP (ref 4–32)
AST SERPL-CCNC: 31 U/L — SIGNIFICANT CHANGE UP (ref 4–32)
BASOPHILS # BLD AUTO: 0.02 K/UL — SIGNIFICANT CHANGE UP (ref 0–0.2)
BASOPHILS # BLD AUTO: 0.02 K/UL — SIGNIFICANT CHANGE UP (ref 0–0.2)
BASOPHILS # BLD AUTO: 0.03 K/UL — SIGNIFICANT CHANGE UP (ref 0–0.2)
BASOPHILS # BLD AUTO: 0.03 K/UL — SIGNIFICANT CHANGE UP (ref 0–0.2)
BASOPHILS NFR BLD AUTO: 0.2 % — SIGNIFICANT CHANGE UP (ref 0–2)
BASOPHILS NFR BLD AUTO: 0.2 % — SIGNIFICANT CHANGE UP (ref 0–2)
BASOPHILS NFR BLD AUTO: 0.3 % — SIGNIFICANT CHANGE UP (ref 0–2)
BASOPHILS NFR BLD AUTO: 0.3 % — SIGNIFICANT CHANGE UP (ref 0–2)
BILIRUB SERPL-MCNC: 0.2 MG/DL — SIGNIFICANT CHANGE UP (ref 0.2–1.2)
BILIRUB SERPL-MCNC: <0.2 MG/DL — SIGNIFICANT CHANGE UP (ref 0.2–1.2)
BUN SERPL-MCNC: 3 MG/DL — LOW (ref 7–23)
BUN SERPL-MCNC: 4 MG/DL — LOW (ref 7–23)
BUN SERPL-MCNC: 6 MG/DL — LOW (ref 7–23)
BUN SERPL-MCNC: 6 MG/DL — LOW (ref 7–23)
CALCIUM SERPL-MCNC: 7.5 MG/DL — LOW (ref 8.4–10.5)
CALCIUM SERPL-MCNC: 7.6 MG/DL — LOW (ref 8.4–10.5)
CALCIUM SERPL-MCNC: 7.8 MG/DL — LOW (ref 8.4–10.5)
CALCIUM SERPL-MCNC: 8.1 MG/DL — LOW (ref 8.4–10.5)
CHLORIDE SERPL-SCNC: 100 MMOL/L — SIGNIFICANT CHANGE UP (ref 98–107)
CHLORIDE SERPL-SCNC: 100 MMOL/L — SIGNIFICANT CHANGE UP (ref 98–107)
CHLORIDE SERPL-SCNC: 101 MMOL/L — SIGNIFICANT CHANGE UP (ref 98–107)
CHLORIDE SERPL-SCNC: 102 MMOL/L — SIGNIFICANT CHANGE UP (ref 98–107)
CO2 SERPL-SCNC: 23 MMOL/L — SIGNIFICANT CHANGE UP (ref 22–31)
CREAT SERPL-MCNC: 0.71 MG/DL — SIGNIFICANT CHANGE UP (ref 0.5–1.3)
CREAT SERPL-MCNC: 0.74 MG/DL — SIGNIFICANT CHANGE UP (ref 0.5–1.3)
CREAT SERPL-MCNC: 0.75 MG/DL — SIGNIFICANT CHANGE UP (ref 0.5–1.3)
CREAT SERPL-MCNC: 0.81 MG/DL — SIGNIFICANT CHANGE UP (ref 0.5–1.3)
EOSINOPHIL # BLD AUTO: 0.03 K/UL — SIGNIFICANT CHANGE UP (ref 0–0.5)
EOSINOPHIL # BLD AUTO: 0.04 K/UL — SIGNIFICANT CHANGE UP (ref 0–0.5)
EOSINOPHIL # BLD AUTO: 0.06 K/UL — SIGNIFICANT CHANGE UP (ref 0–0.5)
EOSINOPHIL # BLD AUTO: 0.09 K/UL — SIGNIFICANT CHANGE UP (ref 0–0.5)
EOSINOPHIL NFR BLD AUTO: 0.3 % — SIGNIFICANT CHANGE UP (ref 0–6)
EOSINOPHIL NFR BLD AUTO: 0.3 % — SIGNIFICANT CHANGE UP (ref 0–6)
EOSINOPHIL NFR BLD AUTO: 0.5 % — SIGNIFICANT CHANGE UP (ref 0–6)
EOSINOPHIL NFR BLD AUTO: 0.8 % — SIGNIFICANT CHANGE UP (ref 0–6)
FIBRINOGEN PPP-MCNC: 480 MG/DL — SIGNIFICANT CHANGE UP (ref 290–520)
FIBRINOGEN PPP-MCNC: 484 MG/DL — SIGNIFICANT CHANGE UP (ref 290–520)
FIBRINOGEN PPP-MCNC: 579 MG/DL — HIGH (ref 290–520)
GLUCOSE SERPL-MCNC: 104 MG/DL — HIGH (ref 70–99)
GLUCOSE SERPL-MCNC: 104 MG/DL — HIGH (ref 70–99)
GLUCOSE SERPL-MCNC: 83 MG/DL — SIGNIFICANT CHANGE UP (ref 70–99)
GLUCOSE SERPL-MCNC: 84 MG/DL — SIGNIFICANT CHANGE UP (ref 70–99)
HCT VFR BLD CALC: 35.9 % — SIGNIFICANT CHANGE UP (ref 34.5–45)
HCT VFR BLD CALC: 36 % — SIGNIFICANT CHANGE UP (ref 34.5–45)
HCT VFR BLD CALC: 38.6 % — SIGNIFICANT CHANGE UP (ref 34.5–45)
HCT VFR BLD CALC: 40.3 % — SIGNIFICANT CHANGE UP (ref 34.5–45)
HGB BLD-MCNC: 12.2 G/DL — SIGNIFICANT CHANGE UP (ref 11.5–15.5)
HGB BLD-MCNC: 12.5 G/DL — SIGNIFICANT CHANGE UP (ref 11.5–15.5)
HGB BLD-MCNC: 13.1 G/DL — SIGNIFICANT CHANGE UP (ref 11.5–15.5)
HGB BLD-MCNC: 13.2 G/DL — SIGNIFICANT CHANGE UP (ref 11.5–15.5)
IANC: 8.08 K/UL — SIGNIFICANT CHANGE UP (ref 1.5–8.5)
IANC: 8.91 K/UL — HIGH (ref 1.5–8.5)
IANC: 9.23 K/UL — HIGH (ref 1.5–8.5)
IANC: 9.36 K/UL — HIGH (ref 1.5–8.5)
IMM GRANULOCYTES NFR BLD AUTO: 0.3 % — SIGNIFICANT CHANGE UP (ref 0–1.5)
IMM GRANULOCYTES NFR BLD AUTO: 0.3 % — SIGNIFICANT CHANGE UP (ref 0–1.5)
IMM GRANULOCYTES NFR BLD AUTO: 0.5 % — SIGNIFICANT CHANGE UP (ref 0–1.5)
IMM GRANULOCYTES NFR BLD AUTO: 0.5 % — SIGNIFICANT CHANGE UP (ref 0–1.5)
INR BLD: 0.9 RATIO — SIGNIFICANT CHANGE UP (ref 0.88–1.16)
INR BLD: 0.92 RATIO — SIGNIFICANT CHANGE UP (ref 0.88–1.16)
INR BLD: <0.9 RATIO — SIGNIFICANT CHANGE UP (ref 0.88–1.16)
LDH SERPL L TO P-CCNC: 223 U/L — SIGNIFICANT CHANGE UP (ref 135–225)
LDH SERPL L TO P-CCNC: 228 U/L — HIGH (ref 135–225)
LDH SERPL L TO P-CCNC: 238 U/L — HIGH (ref 135–225)
LDH SERPL L TO P-CCNC: 262 U/L — HIGH (ref 135–225)
LYMPHOCYTES # BLD AUTO: 1.3 K/UL — SIGNIFICANT CHANGE UP (ref 1–3.3)
LYMPHOCYTES # BLD AUTO: 1.76 K/UL — SIGNIFICANT CHANGE UP (ref 1–3.3)
LYMPHOCYTES # BLD AUTO: 1.82 K/UL — SIGNIFICANT CHANGE UP (ref 1–3.3)
LYMPHOCYTES # BLD AUTO: 1.89 K/UL — SIGNIFICANT CHANGE UP (ref 1–3.3)
LYMPHOCYTES # BLD AUTO: 11.7 % — LOW (ref 13–44)
LYMPHOCYTES # BLD AUTO: 14.8 % — SIGNIFICANT CHANGE UP (ref 13–44)
LYMPHOCYTES # BLD AUTO: 15.7 % — SIGNIFICANT CHANGE UP (ref 13–44)
LYMPHOCYTES # BLD AUTO: 17.4 % — SIGNIFICANT CHANGE UP (ref 13–44)
MAGNESIUM SERPL-MCNC: 5.5 MG/DL — HIGH (ref 1.6–2.6)
MAGNESIUM SERPL-MCNC: 6.1 MG/DL — HIGH (ref 1.6–2.6)
MAGNESIUM SERPL-MCNC: 6.5 MG/DL — HIGH (ref 1.6–2.6)
MAGNESIUM SERPL-MCNC: 6.9 MG/DL — HIGH (ref 1.6–2.6)
MCHC RBC-ENTMCNC: 31.2 PG — SIGNIFICANT CHANGE UP (ref 27–34)
MCHC RBC-ENTMCNC: 31.7 PG — SIGNIFICANT CHANGE UP (ref 27–34)
MCHC RBC-ENTMCNC: 31.8 PG — SIGNIFICANT CHANGE UP (ref 27–34)
MCHC RBC-ENTMCNC: 32.3 PG — SIGNIFICANT CHANGE UP (ref 27–34)
MCHC RBC-ENTMCNC: 32.8 GM/DL — SIGNIFICANT CHANGE UP (ref 32–36)
MCHC RBC-ENTMCNC: 33.9 GM/DL — SIGNIFICANT CHANGE UP (ref 32–36)
MCHC RBC-ENTMCNC: 33.9 GM/DL — SIGNIFICANT CHANGE UP (ref 32–36)
MCHC RBC-ENTMCNC: 34.8 GM/DL — SIGNIFICANT CHANGE UP (ref 32–36)
MCV RBC AUTO: 92.8 FL — SIGNIFICANT CHANGE UP (ref 80–100)
MCV RBC AUTO: 93.5 FL — SIGNIFICANT CHANGE UP (ref 80–100)
MCV RBC AUTO: 93.8 FL — SIGNIFICANT CHANGE UP (ref 80–100)
MCV RBC AUTO: 95.3 FL — SIGNIFICANT CHANGE UP (ref 80–100)
MONOCYTES # BLD AUTO: 0.5 K/UL — SIGNIFICANT CHANGE UP (ref 0–0.9)
MONOCYTES # BLD AUTO: 0.67 K/UL — SIGNIFICANT CHANGE UP (ref 0–0.9)
MONOCYTES # BLD AUTO: 0.74 K/UL — SIGNIFICANT CHANGE UP (ref 0–0.9)
MONOCYTES # BLD AUTO: 0.74 K/UL — SIGNIFICANT CHANGE UP (ref 0–0.9)
MONOCYTES NFR BLD AUTO: 4.5 % — SIGNIFICANT CHANGE UP (ref 2–14)
MONOCYTES NFR BLD AUTO: 5.6 % — SIGNIFICANT CHANGE UP (ref 2–14)
MONOCYTES NFR BLD AUTO: 6.4 % — SIGNIFICANT CHANGE UP (ref 2–14)
MONOCYTES NFR BLD AUTO: 6.8 % — SIGNIFICANT CHANGE UP (ref 2–14)
NEUTROPHILS # BLD AUTO: 8.08 K/UL — HIGH (ref 1.8–7.4)
NEUTROPHILS # BLD AUTO: 8.91 K/UL — HIGH (ref 1.8–7.4)
NEUTROPHILS # BLD AUTO: 9.23 K/UL — HIGH (ref 1.8–7.4)
NEUTROPHILS # BLD AUTO: 9.36 K/UL — HIGH (ref 1.8–7.4)
NEUTROPHILS NFR BLD AUTO: 74.4 % — SIGNIFICANT CHANGE UP (ref 43–77)
NEUTROPHILS NFR BLD AUTO: 76.7 % — SIGNIFICANT CHANGE UP (ref 43–77)
NEUTROPHILS NFR BLD AUTO: 78.8 % — HIGH (ref 43–77)
NEUTROPHILS NFR BLD AUTO: 82.7 % — HIGH (ref 43–77)
NRBC # BLD: 0 /100 WBCS — SIGNIFICANT CHANGE UP
NRBC # FLD: 0 K/UL — SIGNIFICANT CHANGE UP
PLATELET # BLD AUTO: 185 K/UL — SIGNIFICANT CHANGE UP (ref 150–400)
PLATELET # BLD AUTO: 202 K/UL — SIGNIFICANT CHANGE UP (ref 150–400)
PLATELET # BLD AUTO: 211 K/UL — SIGNIFICANT CHANGE UP (ref 150–400)
PLATELET # BLD AUTO: 222 K/UL — SIGNIFICANT CHANGE UP (ref 150–400)
POTASSIUM SERPL-MCNC: 3.5 MMOL/L — SIGNIFICANT CHANGE UP (ref 3.5–5.3)
POTASSIUM SERPL-MCNC: 3.8 MMOL/L — SIGNIFICANT CHANGE UP (ref 3.5–5.3)
POTASSIUM SERPL-MCNC: 3.9 MMOL/L — SIGNIFICANT CHANGE UP (ref 3.5–5.3)
POTASSIUM SERPL-MCNC: 4 MMOL/L — SIGNIFICANT CHANGE UP (ref 3.5–5.3)
POTASSIUM SERPL-SCNC: 3.5 MMOL/L — SIGNIFICANT CHANGE UP (ref 3.5–5.3)
POTASSIUM SERPL-SCNC: 3.8 MMOL/L — SIGNIFICANT CHANGE UP (ref 3.5–5.3)
POTASSIUM SERPL-SCNC: 3.9 MMOL/L — SIGNIFICANT CHANGE UP (ref 3.5–5.3)
POTASSIUM SERPL-SCNC: 4 MMOL/L — SIGNIFICANT CHANGE UP (ref 3.5–5.3)
PROT SERPL-MCNC: 6.2 G/DL — SIGNIFICANT CHANGE UP (ref 6–8.3)
PROT SERPL-MCNC: 6.4 G/DL — SIGNIFICANT CHANGE UP (ref 6–8.3)
PROT SERPL-MCNC: 6.8 G/DL — SIGNIFICANT CHANGE UP (ref 6–8.3)
PROT SERPL-MCNC: 6.9 G/DL — SIGNIFICANT CHANGE UP (ref 6–8.3)
PROTHROM AB SERPL-ACNC: 10.3 SEC — LOW (ref 10.6–13.6)
PROTHROM AB SERPL-ACNC: 10.3 SEC — LOW (ref 10.6–13.6)
PROTHROM AB SERPL-ACNC: 10.6 SEC — SIGNIFICANT CHANGE UP (ref 10.6–13.6)
RBC # BLD: 3.84 M/UL — SIGNIFICANT CHANGE UP (ref 3.8–5.2)
RBC # BLD: 3.87 M/UL — SIGNIFICANT CHANGE UP (ref 3.8–5.2)
RBC # BLD: 4.13 M/UL — SIGNIFICANT CHANGE UP (ref 3.8–5.2)
RBC # BLD: 4.23 M/UL — SIGNIFICANT CHANGE UP (ref 3.8–5.2)
RBC # FLD: 13.5 % — SIGNIFICANT CHANGE UP (ref 10.3–14.5)
RBC # FLD: 13.7 % — SIGNIFICANT CHANGE UP (ref 10.3–14.5)
RBC # FLD: 13.8 % — SIGNIFICANT CHANGE UP (ref 10.3–14.5)
RBC # FLD: 13.8 % — SIGNIFICANT CHANGE UP (ref 10.3–14.5)
SODIUM SERPL-SCNC: 134 MMOL/L — LOW (ref 135–145)
SODIUM SERPL-SCNC: 135 MMOL/L — SIGNIFICANT CHANGE UP (ref 135–145)
SODIUM SERPL-SCNC: 136 MMOL/L — SIGNIFICANT CHANGE UP (ref 135–145)
SODIUM SERPL-SCNC: 136 MMOL/L — SIGNIFICANT CHANGE UP (ref 135–145)
T PALLIDUM AB TITR SER: NEGATIVE — SIGNIFICANT CHANGE UP
URATE SERPL-MCNC: 6.5 MG/DL — SIGNIFICANT CHANGE UP (ref 2.5–7)
URATE SERPL-MCNC: 7.3 MG/DL — HIGH (ref 2.5–7)
URATE SERPL-MCNC: 7.6 MG/DL — HIGH (ref 2.5–7)
WBC # BLD: 10.86 K/UL — HIGH (ref 3.8–10.5)
WBC # BLD: 11.15 K/UL — HIGH (ref 3.8–10.5)
WBC # BLD: 11.61 K/UL — HIGH (ref 3.8–10.5)
WBC # BLD: 11.89 K/UL — HIGH (ref 3.8–10.5)
WBC # FLD AUTO: 10.86 K/UL — HIGH (ref 3.8–10.5)
WBC # FLD AUTO: 11.15 K/UL — HIGH (ref 3.8–10.5)
WBC # FLD AUTO: 11.61 K/UL — HIGH (ref 3.8–10.5)
WBC # FLD AUTO: 11.89 K/UL — HIGH (ref 3.8–10.5)

## 2022-01-10 RX ORDER — BUTORPHANOL TARTRATE 2 MG/ML
2 INJECTION, SOLUTION INTRAMUSCULAR; INTRAVENOUS ONCE
Refills: 0 | Status: DISCONTINUED | OUTPATIENT
Start: 2022-01-10 | End: 2022-01-10

## 2022-01-10 RX ORDER — MAGNESIUM SULFATE 500 MG/ML
1.5 VIAL (ML) INJECTION
Qty: 40 | Refills: 0 | Status: DISCONTINUED | OUTPATIENT
Start: 2022-01-10 | End: 2022-01-12

## 2022-01-10 RX ORDER — ACETAMINOPHEN 500 MG
650 TABLET ORAL ONCE
Refills: 0 | Status: COMPLETED | OUTPATIENT
Start: 2022-01-10 | End: 2022-01-10

## 2022-01-10 RX ORDER — ACETAMINOPHEN 500 MG
975 TABLET ORAL ONCE
Refills: 0 | Status: COMPLETED | OUTPATIENT
Start: 2022-01-10 | End: 2022-01-10

## 2022-01-10 RX ORDER — MAGNESIUM SULFATE 500 MG/ML
1.5 VIAL (ML) INJECTION
Qty: 40 | Refills: 0 | Status: COMPLETED | OUTPATIENT
Start: 2022-01-10 | End: 2022-01-11

## 2022-01-10 RX ORDER — NIFEDIPINE 30 MG
1 TABLET, EXTENDED RELEASE 24 HR ORAL
Qty: 0 | Refills: 0 | DISCHARGE
Start: 2022-01-10

## 2022-01-10 RX ORDER — METOCLOPRAMIDE HCL 10 MG
10 TABLET ORAL ONCE
Refills: 0 | Status: COMPLETED | OUTPATIENT
Start: 2022-01-10 | End: 2022-01-10

## 2022-01-10 RX ORDER — DIPHENHYDRAMINE HCL 50 MG
25 CAPSULE ORAL ONCE
Refills: 0 | Status: COMPLETED | OUTPATIENT
Start: 2022-01-10 | End: 2022-01-10

## 2022-01-10 RX ORDER — NIFEDIPINE 30 MG
30 TABLET, EXTENDED RELEASE 24 HR ORAL DAILY
Refills: 0 | Status: DISCONTINUED | OUTPATIENT
Start: 2022-01-10 | End: 2022-01-12

## 2022-01-10 RX ADMIN — SODIUM CHLORIDE 125 MILLILITER(S): 9 INJECTION, SOLUTION INTRAVENOUS at 19:17

## 2022-01-10 RX ADMIN — BUTORPHANOL TARTRATE 2 MILLIGRAM(S): 2 INJECTION, SOLUTION INTRAMUSCULAR; INTRAVENOUS at 11:20

## 2022-01-10 RX ADMIN — Medication 108 GRAM(S): at 13:18

## 2022-01-10 RX ADMIN — Medication 25 MILLIGRAM(S): at 16:58

## 2022-01-10 RX ADMIN — Medication 37.5 GM/HR: at 19:32

## 2022-01-10 RX ADMIN — Medication 10 MILLIGRAM(S): at 16:57

## 2022-01-10 RX ADMIN — Medication 50 GM/HR: at 15:53

## 2022-01-10 RX ADMIN — Medication 108 GRAM(S): at 01:01

## 2022-01-10 RX ADMIN — Medication 108 GRAM(S): at 05:01

## 2022-01-10 RX ADMIN — Medication 108 GRAM(S): at 21:00

## 2022-01-10 RX ADMIN — BUTORPHANOL TARTRATE 2 MILLIGRAM(S): 2 INJECTION, SOLUTION INTRAMUSCULAR; INTRAVENOUS at 11:03

## 2022-01-10 RX ADMIN — Medication 50 GM/HR: at 07:09

## 2022-01-10 RX ADMIN — Medication 650 MILLIGRAM(S): at 09:15

## 2022-01-10 RX ADMIN — Medication 975 MILLIGRAM(S): at 00:54

## 2022-01-10 RX ADMIN — Medication 30 MILLIGRAM(S): at 05:15

## 2022-01-10 RX ADMIN — Medication 650 MILLIGRAM(S): at 08:56

## 2022-01-10 RX ADMIN — Medication 108 GRAM(S): at 08:57

## 2022-01-10 RX ADMIN — Medication 108 GRAM(S): at 16:56

## 2022-01-10 RX ADMIN — Medication 975 MILLIGRAM(S): at 00:12

## 2022-01-10 NOTE — OB PROVIDER LABOR PROGRESS NOTE - ASSESSMENT
32yo  @38+4 IOL for sPEC    - SVE: 0.5/30/-3 posterior.   - CB attempted and unsuccessful  - c/w po cytotec  - FHT Cat 1, reassuring, ctm    KELLIE Gonzales, PGY1

## 2022-01-10 NOTE — DISCHARGE NOTE OB - MEDICATION SUMMARY - MEDICATIONS TO TAKE
I will START or STAY ON the medications listed below when I get home from the hospital:    NIFEdipine 30 mg oral tablet, extended release  -- 1 tab(s) by mouth once a day  -- Indication: For BP    Prena1 oral capsule  -- 1 cap(s) by mouth once a day  -- Indication: For Pregnancy   I will START or STAY ON the medications listed below when I get home from the hospital:    Tylenol 325 mg oral tablet  -- 3 tab(s) by mouth every 6 hours, As Needed  -- Indication: For PAIN    Motrin 600 mg oral tablet  -- 1 tab(s) by mouth every 6 hours, As Needed  -- Indication: For PAIN    NIFEdipine 30 mg oral tablet, extended release  -- 1 tab(s) by mouth once a day  -- Indication: For BP    Prena1 oral capsule  -- 1 cap(s) by mouth once a day  -- Indication: For Vitamins

## 2022-01-10 NOTE — OB PROVIDER LABOR PROGRESS NOTE - ASSESSMENT
30 yo  @38+4 IOL for sPEC    - CB still in place  - VC#4 placed at 23:13  - T cat 1, reassuring, rene Gonzales, PGY1

## 2022-01-10 NOTE — OB PROVIDER LABOR PROGRESS NOTE - ASSESSMENT
Plan:  -Con't IOL with VC  -CB in place  -Con't EFM, toco, IVF  -Pt not requiring analgesia at this time, no epi 2/2 scoliosis    D/w Dr. Armas PGY-4  CHINEDU Gupta PGY-1 Plan: 32 yo G1 IOL sPEC by BPs on magnesium, Procardia 30 XL s/p hydral 5 (1/9)  -Con't IOL with VC  -CB in place  -Con't EFM, toco, IVF  -Pt not requiring analgesia at this time, no epi 2/2 scoliosis    D/w Dr. Armas PGY-4  CHINEDU Gupta PGY-1

## 2022-01-10 NOTE — DISCHARGE NOTE OB - CARE PROVIDER_API CALL
Frances Mathis)  Obstetrics and Gynecology  [address] [place] [address]  Phone: [phone]  Fax: [phone]  Follow Up Time: 1-3 days

## 2022-01-10 NOTE — CHART NOTE - NSCHARTNOTEFT_GEN_A_CORE
ob attending    pt seen earlier-- co slight headache  vss  fht 110 accels no decels moderate variability  toco irreg  a/p cat 1 fht  vag cytotec  cervical balloon  discussed with anesthesia-- spinal in the even of  section is plan but stadol and PCA for labor  patient now telling anesthesia sister with history of malignant hyperthermia  I discussed with patient baby stable and FHT normal--   magnesium discussed  continue induction as plan is vaginal delivery    now sp stadol variability min to moderate    Jung PEREZ

## 2022-01-10 NOTE — OB PROVIDER LABOR PROGRESS NOTE - ASSESSMENT
-cervical balloon successfully placed  -vaginal cytotec #1 placed at 10a  -category 1 tracing  -c/w magnesium sulfate for seizure ppx    Dr. Mahtis aware  MGreenman PGY3

## 2022-01-10 NOTE — DISCHARGE NOTE OB - NS MD DC FALL RISK RISK
For information on Fall & Injury Prevention, visit: [email or web address] OR  [email or web address] OR  https://www.cdc.gov/steadi/patient.html

## 2022-01-10 NOTE — DISCHARGE NOTE OB - MATERIALS PROVIDED
Vaccinations/[facility]  Screening Program/  Immunization Record/Breastfeeding Log/Breastfeeding Mother’s Support Group Information/Guide to Postpartum Care/[facility] Hearing Screen Program/Shaken Baby Prevention Handout/Birth Certificate Instructions/Discharge Medication Information for Patients and Families Pocket Guide

## 2022-01-10 NOTE — OB PROVIDER LABOR PROGRESS NOTE - ASSESSMENT
-VC#2 placed  -balloon in place  -category 1 tracing  -s/p nandini    d/w Dr. Mathis  [facility] PGY3

## 2022-01-10 NOTE — DISCHARGE NOTE OB - PATIENT PORTAL LINK FT
You can access the FollowMyHealth Patient Portal offered by [facility] by registering at the following website: [email or web address]. By joining [employer]’s FollowMyHealth portal, you will also be able to view your health information using other applications (apps) compatible with our system.

## 2022-01-10 NOTE — DISCHARGE NOTE OB - CARE PLAN
Assessment and plan of treatment:	regular   1 Principal Discharge DX:	 delivery delivered  Assessment and plan of treatment:	Stable  continue supportive care

## 2022-01-11 ENCOUNTER — TRANSCRIPTION ENCOUNTER (OUTPATIENT)
Age: 32
End: 2022-01-11

## 2022-01-11 LAB
ALBUMIN SERPL ELPH-MCNC: 3.1 G/DL — LOW (ref 3.3–5)
ALBUMIN SERPL ELPH-MCNC: 3.2 G/DL — LOW (ref 3.3–5)
ALBUMIN SERPL ELPH-MCNC: 3.4 G/DL — SIGNIFICANT CHANGE UP (ref 3.3–5)
ALBUMIN SERPL ELPH-MCNC: 3.4 G/DL — SIGNIFICANT CHANGE UP (ref 3.3–5)
ALP SERPL-CCNC: 177 U/L — HIGH (ref 40–120)
ALP SERPL-CCNC: 184 U/L — HIGH (ref 40–120)
ALP SERPL-CCNC: 196 U/L — HIGH (ref 40–120)
ALP SERPL-CCNC: 201 U/L — HIGH (ref 40–120)
ALT FLD-CCNC: 13 U/L — SIGNIFICANT CHANGE UP (ref 4–33)
ALT FLD-CCNC: 16 U/L — SIGNIFICANT CHANGE UP (ref 4–33)
ALT FLD-CCNC: 18 U/L — SIGNIFICANT CHANGE UP (ref 4–33)
ALT FLD-CCNC: 20 U/L — SIGNIFICANT CHANGE UP (ref 4–33)
ANION GAP SERPL CALC-SCNC: 12 MMOL/L — SIGNIFICANT CHANGE UP (ref 7–14)
ANION GAP SERPL CALC-SCNC: 12 MMOL/L — SIGNIFICANT CHANGE UP (ref 7–14)
ANION GAP SERPL CALC-SCNC: 13 MMOL/L — SIGNIFICANT CHANGE UP (ref 7–14)
ANION GAP SERPL CALC-SCNC: 14 MMOL/L — SIGNIFICANT CHANGE UP (ref 7–14)
APTT BLD: 29.7 SEC — SIGNIFICANT CHANGE UP (ref 27–36.3)
APTT BLD: 30.1 SEC — SIGNIFICANT CHANGE UP (ref 27–36.3)
APTT BLD: 30.7 SEC — SIGNIFICANT CHANGE UP (ref 27–36.3)
AST SERPL-CCNC: 25 U/L — SIGNIFICANT CHANGE UP (ref 4–32)
AST SERPL-CCNC: 28 U/L — SIGNIFICANT CHANGE UP (ref 4–32)
BASOPHILS # BLD AUTO: 0.02 K/UL — SIGNIFICANT CHANGE UP (ref 0–0.2)
BASOPHILS # BLD AUTO: 0.03 K/UL — SIGNIFICANT CHANGE UP (ref 0–0.2)
BASOPHILS # BLD AUTO: 0.03 K/UL — SIGNIFICANT CHANGE UP (ref 0–0.2)
BASOPHILS # BLD AUTO: 0.04 K/UL — SIGNIFICANT CHANGE UP (ref 0–0.2)
BASOPHILS NFR BLD AUTO: 0.2 % — SIGNIFICANT CHANGE UP (ref 0–2)
BASOPHILS NFR BLD AUTO: 0.2 % — SIGNIFICANT CHANGE UP (ref 0–2)
BASOPHILS NFR BLD AUTO: 0.3 % — SIGNIFICANT CHANGE UP (ref 0–2)
BASOPHILS NFR BLD AUTO: 0.4 % — SIGNIFICANT CHANGE UP (ref 0–2)
BILIRUB SERPL-MCNC: 0.2 MG/DL — SIGNIFICANT CHANGE UP (ref 0.2–1.2)
BILIRUB SERPL-MCNC: 0.3 MG/DL — SIGNIFICANT CHANGE UP (ref 0.2–1.2)
BUN SERPL-MCNC: 4 MG/DL — LOW (ref 7–23)
BUN SERPL-MCNC: 5 MG/DL — LOW (ref 7–23)
BUN SERPL-MCNC: 5 MG/DL — LOW (ref 7–23)
BUN SERPL-MCNC: 6 MG/DL — LOW (ref 7–23)
CALCIUM SERPL-MCNC: 7.7 MG/DL — LOW (ref 8.4–10.5)
CALCIUM SERPL-MCNC: 7.8 MG/DL — LOW (ref 8.4–10.5)
CALCIUM SERPL-MCNC: 7.9 MG/DL — LOW (ref 8.4–10.5)
CALCIUM SERPL-MCNC: 8 MG/DL — LOW (ref 8.4–10.5)
CHLORIDE SERPL-SCNC: 100 MMOL/L — SIGNIFICANT CHANGE UP (ref 98–107)
CHLORIDE SERPL-SCNC: 100 MMOL/L — SIGNIFICANT CHANGE UP (ref 98–107)
CHLORIDE SERPL-SCNC: 98 MMOL/L — SIGNIFICANT CHANGE UP (ref 98–107)
CHLORIDE SERPL-SCNC: 99 MMOL/L — SIGNIFICANT CHANGE UP (ref 98–107)
CO2 SERPL-SCNC: 23 MMOL/L — SIGNIFICANT CHANGE UP (ref 22–31)
CO2 SERPL-SCNC: 24 MMOL/L — SIGNIFICANT CHANGE UP (ref 22–31)
CREAT SERPL-MCNC: 0.74 MG/DL — SIGNIFICANT CHANGE UP (ref 0.5–1.3)
CREAT SERPL-MCNC: 0.76 MG/DL — SIGNIFICANT CHANGE UP (ref 0.5–1.3)
CREAT SERPL-MCNC: 0.76 MG/DL — SIGNIFICANT CHANGE UP (ref 0.5–1.3)
CREAT SERPL-MCNC: 0.78 MG/DL — SIGNIFICANT CHANGE UP (ref 0.5–1.3)
EOSINOPHIL # BLD AUTO: 0.03 K/UL — SIGNIFICANT CHANGE UP (ref 0–0.5)
EOSINOPHIL # BLD AUTO: 0.03 K/UL — SIGNIFICANT CHANGE UP (ref 0–0.5)
EOSINOPHIL # BLD AUTO: 0.05 K/UL — SIGNIFICANT CHANGE UP (ref 0–0.5)
EOSINOPHIL # BLD AUTO: 0.07 K/UL — SIGNIFICANT CHANGE UP (ref 0–0.5)
EOSINOPHIL NFR BLD AUTO: 0.2 % — SIGNIFICANT CHANGE UP (ref 0–6)
EOSINOPHIL NFR BLD AUTO: 0.3 % — SIGNIFICANT CHANGE UP (ref 0–6)
EOSINOPHIL NFR BLD AUTO: 0.5 % — SIGNIFICANT CHANGE UP (ref 0–6)
EOSINOPHIL NFR BLD AUTO: 0.8 % — SIGNIFICANT CHANGE UP (ref 0–6)
FIBRINOGEN PPP-MCNC: 523 MG/DL — HIGH (ref 290–520)
FIBRINOGEN PPP-MCNC: 535 MG/DL — HIGH (ref 290–520)
FIBRINOGEN PPP-MCNC: 555 MG/DL — HIGH (ref 290–520)
GLUCOSE SERPL-MCNC: 114 MG/DL — HIGH (ref 70–99)
GLUCOSE SERPL-MCNC: 82 MG/DL — SIGNIFICANT CHANGE UP (ref 70–99)
GLUCOSE SERPL-MCNC: 83 MG/DL — SIGNIFICANT CHANGE UP (ref 70–99)
GLUCOSE SERPL-MCNC: 83 MG/DL — SIGNIFICANT CHANGE UP (ref 70–99)
HCT VFR BLD CALC: 36.3 % — SIGNIFICANT CHANGE UP (ref 34.5–45)
HCT VFR BLD CALC: 39.2 % — SIGNIFICANT CHANGE UP (ref 34.5–45)
HCT VFR BLD CALC: 39.9 % — SIGNIFICANT CHANGE UP (ref 34.5–45)
HCT VFR BLD CALC: 40.4 % — SIGNIFICANT CHANGE UP (ref 34.5–45)
HGB BLD-MCNC: 12.4 G/DL — SIGNIFICANT CHANGE UP (ref 11.5–15.5)
HGB BLD-MCNC: 12.6 G/DL — SIGNIFICANT CHANGE UP (ref 11.5–15.5)
HGB BLD-MCNC: 12.9 G/DL — SIGNIFICANT CHANGE UP (ref 11.5–15.5)
HGB BLD-MCNC: 13.6 G/DL — SIGNIFICANT CHANGE UP (ref 11.5–15.5)
IANC: 7.09 K/UL — SIGNIFICANT CHANGE UP (ref 1.5–8.5)
IANC: 7.46 K/UL — SIGNIFICANT CHANGE UP (ref 1.5–8.5)
IANC: 7.47 K/UL — SIGNIFICANT CHANGE UP (ref 1.5–8.5)
IANC: 9.55 K/UL — HIGH (ref 1.5–8.5)
IMM GRANULOCYTES NFR BLD AUTO: 0.3 % — SIGNIFICANT CHANGE UP (ref 0–1.5)
IMM GRANULOCYTES NFR BLD AUTO: 0.5 % — SIGNIFICANT CHANGE UP (ref 0–1.5)
IMM GRANULOCYTES NFR BLD AUTO: 0.5 % — SIGNIFICANT CHANGE UP (ref 0–1.5)
IMM GRANULOCYTES NFR BLD AUTO: 0.8 % — SIGNIFICANT CHANGE UP (ref 0–1.5)
INR BLD: 0.91 RATIO — SIGNIFICANT CHANGE UP (ref 0.88–1.16)
INR BLD: 0.93 RATIO — SIGNIFICANT CHANGE UP (ref 0.88–1.16)
INR BLD: 0.93 RATIO — SIGNIFICANT CHANGE UP (ref 0.88–1.16)
INR BLD: <0.9 RATIO — SIGNIFICANT CHANGE UP (ref 0.88–1.16)
LDH SERPL L TO P-CCNC: 254 U/L — HIGH (ref 135–225)
LDH SERPL L TO P-CCNC: 255 U/L — HIGH (ref 135–225)
LDH SERPL L TO P-CCNC: 256 U/L — HIGH (ref 135–225)
LDH SERPL L TO P-CCNC: 269 U/L — HIGH (ref 135–225)
LYMPHOCYTES # BLD AUTO: 1.38 K/UL — SIGNIFICANT CHANGE UP (ref 1–3.3)
LYMPHOCYTES # BLD AUTO: 1.53 K/UL — SIGNIFICANT CHANGE UP (ref 1–3.3)
LYMPHOCYTES # BLD AUTO: 1.77 K/UL — SIGNIFICANT CHANGE UP (ref 1–3.3)
LYMPHOCYTES # BLD AUTO: 1.91 K/UL — SIGNIFICANT CHANGE UP (ref 1–3.3)
LYMPHOCYTES # BLD AUTO: 14.4 % — SIGNIFICANT CHANGE UP (ref 13–44)
LYMPHOCYTES # BLD AUTO: 15 % — SIGNIFICANT CHANGE UP (ref 13–44)
LYMPHOCYTES # BLD AUTO: 16 % — SIGNIFICANT CHANGE UP (ref 13–44)
LYMPHOCYTES # BLD AUTO: 18.8 % — SIGNIFICANT CHANGE UP (ref 13–44)
MAGNESIUM SERPL-MCNC: 5.4 MG/DL — HIGH (ref 1.6–2.6)
MCHC RBC-ENTMCNC: 31.4 PG — SIGNIFICANT CHANGE UP (ref 27–34)
MCHC RBC-ENTMCNC: 31.6 GM/DL — LOW (ref 32–36)
MCHC RBC-ENTMCNC: 31.7 PG — SIGNIFICANT CHANGE UP (ref 27–34)
MCHC RBC-ENTMCNC: 31.9 PG — SIGNIFICANT CHANGE UP (ref 27–34)
MCHC RBC-ENTMCNC: 32 PG — SIGNIFICANT CHANGE UP (ref 27–34)
MCHC RBC-ENTMCNC: 32.9 GM/DL — SIGNIFICANT CHANGE UP (ref 32–36)
MCHC RBC-ENTMCNC: 33.7 GM/DL — SIGNIFICANT CHANGE UP (ref 32–36)
MCHC RBC-ENTMCNC: 34.2 GM/DL — SIGNIFICANT CHANGE UP (ref 32–36)
MCV RBC AUTO: 100.5 FL — HIGH (ref 80–100)
MCV RBC AUTO: 93.3 FL — SIGNIFICANT CHANGE UP (ref 80–100)
MCV RBC AUTO: 93.3 FL — SIGNIFICANT CHANGE UP (ref 80–100)
MCV RBC AUTO: 97.3 FL — SIGNIFICANT CHANGE UP (ref 80–100)
MONOCYTES # BLD AUTO: 0.47 K/UL — SIGNIFICANT CHANGE UP (ref 0–0.9)
MONOCYTES # BLD AUTO: 0.57 K/UL — SIGNIFICANT CHANGE UP (ref 0–0.9)
MONOCYTES # BLD AUTO: 0.69 K/UL — SIGNIFICANT CHANGE UP (ref 0–0.9)
MONOCYTES # BLD AUTO: 0.82 K/UL — SIGNIFICANT CHANGE UP (ref 0–0.9)
MONOCYTES NFR BLD AUTO: 4.9 % — SIGNIFICANT CHANGE UP (ref 2–14)
MONOCYTES NFR BLD AUTO: 6.2 % — SIGNIFICANT CHANGE UP (ref 2–14)
MONOCYTES NFR BLD AUTO: 6.7 % — SIGNIFICANT CHANGE UP (ref 2–14)
MONOCYTES NFR BLD AUTO: 6.8 % — SIGNIFICANT CHANGE UP (ref 2–14)
NEUTROPHILS # BLD AUTO: 7.09 K/UL — SIGNIFICANT CHANGE UP (ref 1.8–7.4)
NEUTROPHILS # BLD AUTO: 7.46 K/UL — HIGH (ref 1.8–7.4)
NEUTROPHILS # BLD AUTO: 7.47 K/UL — HIGH (ref 1.8–7.4)
NEUTROPHILS # BLD AUTO: 9.55 K/UL — HIGH (ref 1.8–7.4)
NEUTROPHILS NFR BLD AUTO: 73.3 % — SIGNIFICANT CHANGE UP (ref 43–77)
NEUTROPHILS NFR BLD AUTO: 76.8 % — SIGNIFICANT CHANGE UP (ref 43–77)
NEUTROPHILS NFR BLD AUTO: 78 % — HIGH (ref 43–77)
NEUTROPHILS NFR BLD AUTO: 78.1 % — HIGH (ref 43–77)
NRBC # BLD: 0 /100 WBCS — SIGNIFICANT CHANGE UP
NRBC # FLD: 0 K/UL — SIGNIFICANT CHANGE UP
PLATELET # BLD AUTO: 195 K/UL — SIGNIFICANT CHANGE UP (ref 150–400)
PLATELET # BLD AUTO: 206 K/UL — SIGNIFICANT CHANGE UP (ref 150–400)
PLATELET # BLD AUTO: 211 K/UL — SIGNIFICANT CHANGE UP (ref 150–400)
PLATELET # BLD AUTO: 219 K/UL — SIGNIFICANT CHANGE UP (ref 150–400)
POTASSIUM SERPL-MCNC: 3.8 MMOL/L — SIGNIFICANT CHANGE UP (ref 3.5–5.3)
POTASSIUM SERPL-MCNC: 3.8 MMOL/L — SIGNIFICANT CHANGE UP (ref 3.5–5.3)
POTASSIUM SERPL-MCNC: 3.9 MMOL/L — SIGNIFICANT CHANGE UP (ref 3.5–5.3)
POTASSIUM SERPL-MCNC: 4 MMOL/L — SIGNIFICANT CHANGE UP (ref 3.5–5.3)
POTASSIUM SERPL-SCNC: 3.8 MMOL/L — SIGNIFICANT CHANGE UP (ref 3.5–5.3)
POTASSIUM SERPL-SCNC: 3.8 MMOL/L — SIGNIFICANT CHANGE UP (ref 3.5–5.3)
POTASSIUM SERPL-SCNC: 3.9 MMOL/L — SIGNIFICANT CHANGE UP (ref 3.5–5.3)
POTASSIUM SERPL-SCNC: 4 MMOL/L — SIGNIFICANT CHANGE UP (ref 3.5–5.3)
PROT SERPL-MCNC: 6.2 G/DL — SIGNIFICANT CHANGE UP (ref 6–8.3)
PROT SERPL-MCNC: 6.3 G/DL — SIGNIFICANT CHANGE UP (ref 6–8.3)
PROT SERPL-MCNC: 6.6 G/DL — SIGNIFICANT CHANGE UP (ref 6–8.3)
PROT SERPL-MCNC: 6.6 G/DL — SIGNIFICANT CHANGE UP (ref 6–8.3)
PROTHROM AB SERPL-ACNC: 10.3 SEC — LOW (ref 10.6–13.6)
PROTHROM AB SERPL-ACNC: 10.4 SEC — LOW (ref 10.6–13.6)
PROTHROM AB SERPL-ACNC: 10.6 SEC — SIGNIFICANT CHANGE UP (ref 10.6–13.6)
PROTHROM AB SERPL-ACNC: 10.6 SEC — SIGNIFICANT CHANGE UP (ref 10.6–13.6)
RBC # BLD: 3.89 M/UL — SIGNIFICANT CHANGE UP (ref 3.8–5.2)
RBC # BLD: 3.97 M/UL — SIGNIFICANT CHANGE UP (ref 3.8–5.2)
RBC # BLD: 4.03 M/UL — SIGNIFICANT CHANGE UP (ref 3.8–5.2)
RBC # BLD: 4.33 M/UL — SIGNIFICANT CHANGE UP (ref 3.8–5.2)
RBC # FLD: 13.5 % — SIGNIFICANT CHANGE UP (ref 10.3–14.5)
RBC # FLD: 13.7 % — SIGNIFICANT CHANGE UP (ref 10.3–14.5)
RBC # FLD: 13.7 % — SIGNIFICANT CHANGE UP (ref 10.3–14.5)
RBC # FLD: 13.9 % — SIGNIFICANT CHANGE UP (ref 10.3–14.5)
SODIUM SERPL-SCNC: 134 MMOL/L — LOW (ref 135–145)
SODIUM SERPL-SCNC: 135 MMOL/L — SIGNIFICANT CHANGE UP (ref 135–145)
SODIUM SERPL-SCNC: 136 MMOL/L — SIGNIFICANT CHANGE UP (ref 135–145)
SODIUM SERPL-SCNC: 136 MMOL/L — SIGNIFICANT CHANGE UP (ref 135–145)
URATE SERPL-MCNC: 7 MG/DL — SIGNIFICANT CHANGE UP (ref 2.5–7)
URATE SERPL-MCNC: 7.2 MG/DL — HIGH (ref 2.5–7)
URATE SERPL-MCNC: 7.2 MG/DL — HIGH (ref 2.5–7)
URATE SERPL-MCNC: 7.3 MG/DL — HIGH (ref 2.5–7)
WBC # BLD: 10.18 K/UL — SIGNIFICANT CHANGE UP (ref 3.8–10.5)
WBC # BLD: 12.26 K/UL — HIGH (ref 3.8–10.5)
WBC # BLD: 9.22 K/UL — SIGNIFICANT CHANGE UP (ref 3.8–10.5)
WBC # BLD: 9.56 K/UL — SIGNIFICANT CHANGE UP (ref 3.8–10.5)
WBC # FLD AUTO: 10.18 K/UL — SIGNIFICANT CHANGE UP (ref 3.8–10.5)
WBC # FLD AUTO: 12.26 K/UL — HIGH (ref 3.8–10.5)
WBC # FLD AUTO: 9.22 K/UL — SIGNIFICANT CHANGE UP (ref 3.8–10.5)
WBC # FLD AUTO: 9.56 K/UL — SIGNIFICANT CHANGE UP (ref 3.8–10.5)

## 2022-01-11 RX ORDER — OXYTOCIN 10 UNIT/ML
VIAL (ML) INJECTION
Qty: 30 | Refills: 0 | Status: DISCONTINUED | OUTPATIENT
Start: 2022-01-11 | End: 2022-01-12

## 2022-01-11 RX ORDER — ONDANSETRON 8 MG/1
4 TABLET, FILM COATED ORAL ONCE
Refills: 0 | Status: DISCONTINUED | OUTPATIENT
Start: 2022-01-11 | End: 2022-01-12

## 2022-01-11 RX ORDER — ACETAMINOPHEN 500 MG
1000 TABLET ORAL EVERY 6 HOURS
Refills: 0 | Status: COMPLETED | OUTPATIENT
Start: 2022-01-11 | End: 2022-01-12

## 2022-01-11 RX ORDER — BUTORPHANOL TARTRATE 2 MG/ML
2 INJECTION, SOLUTION INTRAMUSCULAR; INTRAVENOUS ONCE
Refills: 0 | Status: DISCONTINUED | OUTPATIENT
Start: 2022-01-11 | End: 2022-01-11

## 2022-01-11 RX ORDER — ACETAMINOPHEN 500 MG
975 TABLET ORAL ONCE
Refills: 0 | Status: COMPLETED | OUTPATIENT
Start: 2022-01-11 | End: 2022-01-11

## 2022-01-11 RX ORDER — FENTANYL CITRATE 50 UG/ML
25 INJECTION INTRAVENOUS
Refills: 0 | Status: DISCONTINUED | OUTPATIENT
Start: 2022-01-11 | End: 2022-01-12

## 2022-01-11 RX ORDER — ONDANSETRON 8 MG/1
4 TABLET, FILM COATED ORAL EVERY 6 HOURS
Refills: 0 | Status: DISCONTINUED | OUTPATIENT
Start: 2022-01-11 | End: 2022-01-12

## 2022-01-11 RX ORDER — NALOXONE HYDROCHLORIDE 4 MG/.1ML
0.1 SPRAY NASAL
Refills: 0 | Status: DISCONTINUED | OUTPATIENT
Start: 2022-01-11 | End: 2022-01-12

## 2022-01-11 RX ORDER — FENTANYL CITRATE 50 UG/ML
30 INJECTION INTRAVENOUS
Refills: 0 | Status: DISCONTINUED | OUTPATIENT
Start: 2022-01-11 | End: 2022-01-12

## 2022-01-11 RX ADMIN — BUTORPHANOL TARTRATE 2 MILLIGRAM(S): 2 INJECTION, SOLUTION INTRAMUSCULAR; INTRAVENOUS at 18:00

## 2022-01-11 RX ADMIN — Medication 975 MILLIGRAM(S): at 18:32

## 2022-01-11 RX ADMIN — Medication 2 MILLIUNIT(S)/MIN: at 15:29

## 2022-01-11 RX ADMIN — FENTANYL CITRATE 30 MILLILITER(S): 50 INJECTION INTRAVENOUS at 22:06

## 2022-01-11 RX ADMIN — Medication 108 GRAM(S): at 09:15

## 2022-01-11 RX ADMIN — Medication 30 MILLIGRAM(S): at 06:00

## 2022-01-11 RX ADMIN — Medication 108 GRAM(S): at 23:35

## 2022-01-11 RX ADMIN — Medication 975 MILLIGRAM(S): at 09:28

## 2022-01-11 RX ADMIN — Medication 108 GRAM(S): at 14:40

## 2022-01-11 RX ADMIN — Medication 108 GRAM(S): at 18:52

## 2022-01-11 RX ADMIN — Medication 975 MILLIGRAM(S): at 10:00

## 2022-01-11 RX ADMIN — Medication 975 MILLIGRAM(S): at 17:59

## 2022-01-11 RX ADMIN — Medication 108 GRAM(S): at 01:38

## 2022-01-11 RX ADMIN — Medication 108 GRAM(S): at 05:44

## 2022-01-11 RX ADMIN — BUTORPHANOL TARTRATE 2 MILLIGRAM(S): 2 INJECTION, SOLUTION INTRAMUSCULAR; INTRAVENOUS at 17:32

## 2022-01-11 NOTE — OB PROVIDER LABOR PROGRESS NOTE - ASSESSMENT
32yo  @38+5 IOL for sPEC    - SVE: /-3  - FHT Cat 2, overall reassuring, ctm  - c/w pitocin  - c/w amp for pos GBS    d/w Dr. Eagle Gonzales, PGY1

## 2022-01-11 NOTE — OB PROVIDER LABOR PROGRESS NOTE - ASSESSMENT
23 yo P0 IOL sPEC(BP) on Mg  -Con't IOL with pitocin  -s/p IUPC placement  -Con't EFM, IVF, toco  -s/p stadol x1, comfortable    D/w Dr. Eagle Gupta PGY-1

## 2022-01-11 NOTE — OB PROVIDER LABOR PROGRESS NOTE - ASSESSMENT
32yo  @38+5 IOL for sPEC    - SVE: /-3  - s/p PO and CB, VC#5 placed at [address] [place] [address] cat 1, reassuring, ctm    d/w Dr. Nikole Gonzales, PGY1

## 2022-01-11 NOTE — OB PROVIDER LABOR PROGRESS NOTE - ASSESSMENT
Plan: 32 yo G1 IOL sPEC by BPs on magnesium, Procardia 30 XL s/p hydral 5 (1/9)  -s/p last VC#7, s/p CB  -evaluate for AROM at next exam  -Con't EFM, toco, IVF  -Pt not requiring analgesia at this time, no epi 2/2 scoliosis    D/w Dr. Rom Gupta PGY-1

## 2022-01-11 NOTE — OB PROVIDER LABOR PROGRESS NOTE - ASSESSMENT
30yo  at 38w5d sPEC/Mg IOL     - stadol for pain control   - Category 1 FHT, reassuring at this time  - continuous monitoring   - IUPC placed, titrate pitocin   - anticipate    - s/p SROM    d/w Dr. Eagle Guerrero PGY2

## 2022-01-11 NOTE — OB PROVIDER LABOR PROGRESS NOTE - ASSESSMENT
Plan: 31y y/o  @ 38w5d in stable condition  - Con't IOL with aginal cytotec  - Continuous EFM, [place]  - Con't IVF    plan per attending Dr Cueto  Placed by Cesar PEREZ

## 2022-01-12 LAB
ANISOCYTOSIS BLD QL: SLIGHT — SIGNIFICANT CHANGE UP
APTT BLD: 28.5 SEC — SIGNIFICANT CHANGE UP (ref 27–36.3)
BASOPHILS # BLD AUTO: 0 K/UL — SIGNIFICANT CHANGE UP (ref 0–0.2)
BASOPHILS NFR BLD AUTO: 0 % — SIGNIFICANT CHANGE UP (ref 0–2)
EOSINOPHIL # BLD AUTO: 0 K/UL — SIGNIFICANT CHANGE UP (ref 0–0.5)
EOSINOPHIL NFR BLD AUTO: 0 % — SIGNIFICANT CHANGE UP (ref 0–6)
FIBRINOGEN PPP-MCNC: 555 MG/DL — HIGH (ref 290–520)
GIANT PLATELETS BLD QL SMEAR: PRESENT — SIGNIFICANT CHANGE UP
HCT VFR BLD CALC: 36.3 % — SIGNIFICANT CHANGE UP (ref 34.5–45)
HGB BLD-MCNC: 12.2 G/DL — SIGNIFICANT CHANGE UP (ref 11.5–15.5)
IANC: 17.19 K/UL — HIGH (ref 1.5–8.5)
INR BLD: 0.9 RATIO — SIGNIFICANT CHANGE UP (ref 0.88–1.16)
LYMPHOCYTES # BLD AUTO: 0.49 K/UL — LOW (ref 1–3.3)
LYMPHOCYTES # BLD AUTO: 2.6 % — LOW (ref 13–44)
MACROCYTES BLD QL: SLIGHT — SIGNIFICANT CHANGE UP
MAGNESIUM SERPL-MCNC: 5.4 MG/DL — HIGH (ref 1.6–2.6)
MAGNESIUM SERPL-MCNC: 5.5 MG/DL — HIGH (ref 1.6–2.6)
MAGNESIUM SERPL-MCNC: 6.3 MG/DL — HIGH (ref 1.6–2.6)
MANUAL SMEAR VERIFICATION: SIGNIFICANT CHANGE UP
MCHC RBC-ENTMCNC: 32 PG — SIGNIFICANT CHANGE UP (ref 27–34)
MCHC RBC-ENTMCNC: 33.6 GM/DL — SIGNIFICANT CHANGE UP (ref 32–36)
MCV RBC AUTO: 95.3 FL — SIGNIFICANT CHANGE UP (ref 80–100)
MONOCYTES # BLD AUTO: 0.34 K/UL — SIGNIFICANT CHANGE UP (ref 0–0.9)
MONOCYTES NFR BLD AUTO: 1.8 % — LOW (ref 2–14)
MYELOCYTES NFR BLD: 0.9 % — HIGH (ref 0–0)
NEUTROPHILS # BLD AUTO: 17.86 K/UL — HIGH (ref 1.8–7.4)
NEUTROPHILS NFR BLD AUTO: 94.7 % — HIGH (ref 43–77)
PLAT MORPH BLD: ABNORMAL
PLATELET # BLD AUTO: 203 K/UL — SIGNIFICANT CHANGE UP (ref 150–400)
PLATELET COUNT - ESTIMATE: NORMAL — SIGNIFICANT CHANGE UP
POIKILOCYTOSIS BLD QL AUTO: SLIGHT — SIGNIFICANT CHANGE UP
POLYCHROMASIA BLD QL SMEAR: SLIGHT — SIGNIFICANT CHANGE UP
PROTHROM AB SERPL-ACNC: 10.4 SEC — LOW (ref 10.6–13.6)
RBC # BLD: 3.81 M/UL — SIGNIFICANT CHANGE UP (ref 3.8–5.2)
RBC # FLD: 13.3 % — SIGNIFICANT CHANGE UP (ref 10.3–14.5)
RBC BLD AUTO: ABNORMAL
WBC # BLD: 18.86 K/UL — HIGH (ref 3.8–10.5)
WBC # FLD AUTO: 18.86 K/UL — HIGH (ref 3.8–10.5)

## 2022-01-12 RX ORDER — DIPHENHYDRAMINE HCL 50 MG
25 CAPSULE ORAL EVERY 4 HOURS
Refills: 0 | Status: DISCONTINUED | OUTPATIENT
Start: 2022-01-12 | End: 2022-01-14

## 2022-01-12 RX ORDER — HEPARIN SODIUM 5000 [USP'U]/ML
5000 INJECTION INTRAVENOUS; SUBCUTANEOUS EVERY 12 HOURS
Refills: 0 | Status: DISCONTINUED | OUTPATIENT
Start: 2022-01-12 | End: 2022-01-14

## 2022-01-12 RX ORDER — MAGNESIUM SULFATE 500 MG/ML
1.5 VIAL (ML) INJECTION
Qty: 40 | Refills: 0 | Status: DISCONTINUED | OUTPATIENT
Start: 2022-01-12 | End: 2022-01-13

## 2022-01-12 RX ORDER — OXYCODONE HYDROCHLORIDE 5 MG/1
5 TABLET ORAL ONCE
Refills: 0 | Status: DISCONTINUED | OUTPATIENT
Start: 2022-01-12 | End: 2022-01-14

## 2022-01-12 RX ORDER — NALOXONE HYDROCHLORIDE 4 MG/.1ML
0.1 SPRAY NASAL
Refills: 0 | Status: DISCONTINUED | OUTPATIENT
Start: 2022-01-12 | End: 2022-01-14

## 2022-01-12 RX ORDER — ONDANSETRON 8 MG/1
4 TABLET, FILM COATED ORAL EVERY 6 HOURS
Refills: 0 | Status: DISCONTINUED | OUTPATIENT
Start: 2022-01-12 | End: 2022-01-14

## 2022-01-12 RX ORDER — OXYTOCIN 10 UNIT/ML
62.5 VIAL (ML) INJECTION
Qty: 20 | Refills: 0 | Status: DISCONTINUED | OUTPATIENT
Start: 2022-01-12 | End: 2022-01-13

## 2022-01-12 RX ORDER — BUTORPHANOL TARTRATE 2 MG/ML
0.12 INJECTION, SOLUTION INTRAMUSCULAR; INTRAVENOUS EVERY 6 HOURS
Refills: 0 | Status: DISCONTINUED | OUTPATIENT
Start: 2022-01-12 | End: 2022-01-14

## 2022-01-12 RX ORDER — SODIUM CHLORIDE 9 MG/ML
1000 INJECTION, SOLUTION INTRAVENOUS
Refills: 0 | Status: DISCONTINUED | OUTPATIENT
Start: 2022-01-12 | End: 2022-01-14

## 2022-01-12 RX ORDER — CITRIC ACID/SODIUM CITRATE 300-500 MG
30 SOLUTION, ORAL ORAL ONCE
Refills: 0 | Status: DISCONTINUED | OUTPATIENT
Start: 2022-01-12 | End: 2022-01-12

## 2022-01-12 RX ORDER — HYDROMORPHONE HYDROCHLORIDE 2 MG/ML
1 INJECTION INTRAMUSCULAR; INTRAVENOUS; SUBCUTANEOUS
Refills: 0 | Status: DISCONTINUED | OUTPATIENT
Start: 2022-01-12 | End: 2022-01-12

## 2022-01-12 RX ORDER — TETANUS TOXOID, REDUCED DIPHTHERIA TOXOID AND ACELLULAR PERTUSSIS VACCINE, ADSORBED 5; 2.5; 8; 8; 2.5 [IU]/.5ML; [IU]/.5ML; UG/.5ML; UG/.5ML; UG/.5ML
0.5 SUSPENSION INTRAMUSCULAR ONCE
Refills: 0 | Status: DISCONTINUED | OUTPATIENT
Start: 2022-01-12 | End: 2022-01-14

## 2022-01-12 RX ORDER — SIMETHICONE 80 MG/1
80 TABLET, CHEWABLE ORAL EVERY 4 HOURS
Refills: 0 | Status: DISCONTINUED | OUTPATIENT
Start: 2022-01-12 | End: 2022-01-14

## 2022-01-12 RX ORDER — OXYCODONE HYDROCHLORIDE 5 MG/1
10 TABLET ORAL
Refills: 0 | Status: DISCONTINUED | OUTPATIENT
Start: 2022-01-12 | End: 2022-01-12

## 2022-01-12 RX ORDER — IBUPROFEN 200 MG
600 TABLET ORAL EVERY 6 HOURS
Refills: 0 | Status: COMPLETED | OUTPATIENT
Start: 2022-01-12 | End: 2022-12-11

## 2022-01-12 RX ORDER — HYDROMORPHONE HYDROCHLORIDE 2 MG/ML
0.5 INJECTION INTRAMUSCULAR; INTRAVENOUS; SUBCUTANEOUS
Refills: 0 | Status: DISCONTINUED | OUTPATIENT
Start: 2022-01-12 | End: 2022-01-12

## 2022-01-12 RX ORDER — KETOROLAC TROMETHAMINE 30 MG/ML
30 SYRINGE (ML) INJECTION EVERY 6 HOURS
Refills: 0 | Status: DISCONTINUED | OUTPATIENT
Start: 2022-01-12 | End: 2022-01-13

## 2022-01-12 RX ORDER — ACETAMINOPHEN 500 MG
975 TABLET ORAL
Refills: 0 | Status: DISCONTINUED | OUTPATIENT
Start: 2022-01-12 | End: 2022-01-14

## 2022-01-12 RX ORDER — LANOLIN
1 OINTMENT (GRAM) TOPICAL EVERY 6 HOURS
Refills: 0 | Status: DISCONTINUED | OUTPATIENT
Start: 2022-01-12 | End: 2022-01-14

## 2022-01-12 RX ORDER — FAMOTIDINE 10 MG/ML
20 INJECTION INTRAVENOUS
Refills: 0 | Status: DISCONTINUED | OUTPATIENT
Start: 2022-01-12 | End: 2022-01-12

## 2022-01-12 RX ORDER — NALBUPHINE HYDROCHLORIDE 10 MG/ML
2.5 INJECTION, SOLUTION INTRAMUSCULAR; INTRAVENOUS; SUBCUTANEOUS ONCE
Refills: 0 | Status: COMPLETED | OUTPATIENT
Start: 2022-01-12 | End: 2022-01-12

## 2022-01-12 RX ORDER — MAGNESIUM HYDROXIDE 400 MG/1
30 TABLET, CHEWABLE ORAL
Refills: 0 | Status: DISCONTINUED | OUTPATIENT
Start: 2022-01-12 | End: 2022-01-14

## 2022-01-12 RX ORDER — OXYCODONE HYDROCHLORIDE 5 MG/1
5 TABLET ORAL
Refills: 0 | Status: DISCONTINUED | OUTPATIENT
Start: 2022-01-12 | End: 2022-01-12

## 2022-01-12 RX ORDER — OXYCODONE HYDROCHLORIDE 5 MG/1
5 TABLET ORAL
Refills: 0 | Status: DISCONTINUED | OUTPATIENT
Start: 2022-01-12 | End: 2022-01-14

## 2022-01-12 RX ORDER — MORPHINE SULFATE 50 MG/1
0.1 CAPSULE, EXTENDED RELEASE ORAL ONCE
Refills: 0 | Status: DISCONTINUED | OUTPATIENT
Start: 2022-01-12 | End: 2022-01-13

## 2022-01-12 RX ORDER — DIPHENHYDRAMINE HCL 50 MG
25 CAPSULE ORAL EVERY 6 HOURS
Refills: 0 | Status: DISCONTINUED | OUTPATIENT
Start: 2022-01-12 | End: 2022-01-14

## 2022-01-12 RX ORDER — NALBUPHINE HYDROCHLORIDE 10 MG/ML
0.25 INJECTION, SOLUTION INTRAMUSCULAR; INTRAVENOUS; SUBCUTANEOUS ONCE
Refills: 0 | Status: DISCONTINUED | OUTPATIENT
Start: 2022-01-12 | End: 2022-01-12

## 2022-01-12 RX ADMIN — Medication 37.5 GM/HR: at 19:20

## 2022-01-12 RX ADMIN — Medication 975 MILLIGRAM(S): at 21:10

## 2022-01-12 RX ADMIN — NALBUPHINE HYDROCHLORIDE 2.5 MILLIGRAM(S): 10 INJECTION, SOLUTION INTRAMUSCULAR; INTRAVENOUS; SUBCUTANEOUS at 08:30

## 2022-01-12 RX ADMIN — HEPARIN SODIUM 5000 UNIT(S): 5000 INJECTION INTRAVENOUS; SUBCUTANEOUS at 09:45

## 2022-01-12 RX ADMIN — Medication 30 MILLIGRAM(S): at 19:00

## 2022-01-12 RX ADMIN — Medication 975 MILLIGRAM(S): at 12:56

## 2022-01-12 RX ADMIN — Medication 975 MILLIGRAM(S): at 13:50

## 2022-01-12 RX ADMIN — Medication 37.5 GM/HR: at 04:21

## 2022-01-12 RX ADMIN — Medication 30 MILLIGRAM(S): at 10:00

## 2022-01-12 RX ADMIN — Medication 25 MILLIGRAM(S): at 04:30

## 2022-01-12 RX ADMIN — Medication 37.5 GM/HR: at 19:18

## 2022-01-12 RX ADMIN — Medication 188 MILLIUNIT(S)/MIN: at 07:16

## 2022-01-12 RX ADMIN — SODIUM CHLORIDE 62.5 MILLILITER(S): 9 INJECTION, SOLUTION INTRAVENOUS at 10:00

## 2022-01-12 RX ADMIN — Medication 975 MILLIGRAM(S): at 22:00

## 2022-01-12 RX ADMIN — Medication 30 MILLIGRAM(S): at 09:45

## 2022-01-12 RX ADMIN — HEPARIN SODIUM 5000 UNIT(S): 5000 INJECTION INTRAVENOUS; SUBCUTANEOUS at 21:10

## 2022-01-12 RX ADMIN — Medication 37.5 GM/HR: at 10:48

## 2022-01-12 RX ADMIN — Medication 30 MILLIGRAM(S): at 18:34

## 2022-01-12 RX ADMIN — Medication 400 MILLIGRAM(S): at 01:07

## 2022-01-12 NOTE — OB PROVIDER LABOR PROGRESS NOTE - NS_OBIHICONTRACTIONPATTERNDETAILS_OBGYN_ALL_OB_FT
irregular
q5 min
irregular
q3 min
q3-4 min
q3 min
q4
q5 min ctx
q3 min
CTX q4-6
initially 3-4ctx/10min-> now none
q3 min

## 2022-01-12 NOTE — OB PROVIDER DELIVERY SUMMARY - NSSELHIDDEN_OBGYN_ALL_OB_FT
Telephone Encounter by Angela Leavitt RN, MSN at 09/11/17 03:31 PM     Author:  Angela Leavitt RN, MSN Service:  (none) Author Type:  Registered Nurse     Filed:  09/11/17 03:35 PM Encounter Date:  9/11/2017 Status:  Signed     :  Angela Leavitt RN, MSN (Registered Nurse)            S/p TURBT performed 8/24.  Spoke with Homero.  He states he saw blood twice today.  No frequency, urgency or dysuria.  Denies any difficulty with urination or clots seen.  He restarted baby ASA yesterday.  Advised Homero to increased fluids and hold ASA until urine has been clear for a full three days.  If bleeding worsens or he has difficulty urinating, he is to contact the office back.[CD1.1M]      Revision History        User Key Date/Time User Provider Type Action    > CD1.1 09/11/17 03:35 PM Angela Leavitt RN, MSN Registered Nurse Sign    M - Manual             [NS_DeliveryAttending1_OBGYN_ALL_OB_FT:[email or web address]],[NS_DeliveryAssist1_OBGYN_ALL_OB_FT:[email or web address]]

## 2022-01-12 NOTE — OB PROVIDER LABOR PROGRESS NOTE - NS_ADDCERVICALEXAM_OBGYN_ALL_OB
Click to add…

## 2022-01-12 NOTE — OB PROVIDER LABOR PROGRESS NOTE - NS_SUBJECTIVE/OBJECTIVE_OBGYN_ALL_OB_FT
pt seen for placement of cervical balloon
Called to bedside as patient wanted to discuss elective  section. She expressed frustration over length of induction/labor and not adequately controlled pain.    Discussed that some variables are now occurring with contractions as we have increased pitocin to 18mU to try and have adequate ctx. Exam is essentially unchanged. Bedside sono performed showed direct OP position. Significant molding noted on exam. Explained that the position of the head can prolong and make the labor process more painful. Head was too molded to effectively attempt to rotate or palpate suture lines.     After the bedside sonogram, prolonged deceleration noted. Pitocin turned off and terbutaline administered with improvement of FHR to baseline. Dr. Guillermo was present in the room.    Had a comprehensive discussion with patient and her partner regarding her labor course and projected labor course in addition to the concern for fetal status given the recent decelerations. In addition reviewed the specific risks of anesthesia to the patient and our goal to avoid an emergency . Patient had the opportunity to ask questions and call her mother. She and her  agreed that they would like to proceed with  delivery and had all questions answered to their apparent satisfaction.     Anesthesia notified and OR 1 ready.
Patient reports minimal pain relief with her PCA.
Pt seen at bedside for placement of VC#7. +bulging bag
VC#4 placed. CB in place, approx 3 cm dilated.
pt seen for placement of vaginal cytoec
Pt examined at bedside for cervical examination and IUPC placement. Patient reports pain and unable to receive an epidural per anesthesia     Vital Signs Last 24 Hrs  T(C): 36.7 (11 Jan 2022 16:00), Max: 37.0 (10 Christoph 2022 18:06)  T(F): 98.06 (11 Jan 2022 16:00), Max: 98.6 (10 Christoph 2022 18:06)  HR: 70 (11 Jan 2022 17:18) (54 - 93)  BP: 146/91 (11 Jan 2022 17:09) (100/56 - 152/92)  BP(mean): --  RR: 15 (11 Jan 2022 07:07) (15 - 15)  SpO2: 98% (11 Jan 2022 17:18) (91% - 99%)
patient seen for attempted placement of cervical balloon
Note delayed due to clinical duties    CB attempted around midnight but unsuccessful.
Patient s/p CB. VC#5 placed. Cervix is still long and posterior towards maternal right side.
Pt examined for placement of VC#3.
Pt seen at bedside for replacement of IUPC. Clear flash.
R1 Labor & Delivery Progress Note     Pt seen & examined at bedside for placement of vaginal cytotec.    SVE:  4/50/-3  EFM: 120/mod. variability/+accels/-decels  [place]: irregular    T(C): 37.0 (01-11-22 @ 04:00), Max: 37.0 (01-10-22 @ 18:06)  HR: 80 (01-11-22 @ 08:18) (54 - 96)  BP: 118/74 (01-11-22 @ 07:54) (100/56 - 148/92)  RR: 15 (01-11-22 @ 07:07) (15 - 15)  SpO2: 97% (01-11-22 @ 08:18) (92% - 98%)
Patient examined at bedside, s/p PO cyotec

## 2022-01-12 NOTE — OB PROVIDER DELIVERY SUMMARY - NSPROVIDERDELIVERYNOTE_OBGYN_ALL_OB_FT
unscheduled elective pLTCS  Viable vertex direct OP male infant, apgars 9/9, weight 6#1  Hysterotomy closed in 1 layer using caprosyn  Grossly normal uterus, tubes, and ovaries  Fascia closed using 0.0 Vicryl  Subcutaneous tissue closed using plain gut interrupted suture  Skin closed in subcuticular fashion  Pt and infant to recovery in stable condition    QBL: 266 mL     IVF: 1500 mL       UOP: 325 mL

## 2022-01-12 NOTE — OB PROVIDER LABOR PROGRESS NOTE - NS_OBIHIFHRDETAILS_OBGYN_ALL_OB_FT
140, mod, +accel, -decel
125/ mod variability/+ accels/ - decels
140/mod pete/+accels/-decels
125/mod pete/+accels/-decels
baseline 110, mod pete, +accels, no decels
baseline 120, mod pete, +accels, no decels
baseline 120, mod pete, +accels
145/mod pete/+accels/-decels
Cat 1: 110/mod variability/ + accels/ early and variable decels
Cat 1: 120/mod variability/ + accels/ - decels
Baseline 120s/Moderate variability/ + accels /- deccels
120, mod pete, +accels s/p terbutaline for 7 min decel

## 2022-01-12 NOTE — OB PROVIDER LABOR PROGRESS NOTE - NSVAGINALEXAM_OBGYN_ALL_OB_DT
09-Jan-2022 15:52
11-Jan-2022 04:19
11-Jan-2022 23:32
11-Jan-2022 08:20
12-Jan-2022 02:08
10-Christoph-2022 12:44
10-Christpoh-2022 08:15
11-Jan-2022 17:22
11-Jan-2022 11:59
10-Christoph-2022 18:09
11-Jan-2022 15:58

## 2022-01-12 NOTE — OB PROVIDER LABOR PROGRESS NOTE - ASSESSMENT
30yo  at 38w6d here for sPEC/Mg IOL with prolonged induction course noted to have variable deceleration, inadequate ctx, and OP position. Patient would like to proceed with elective primary  section.    Dr. Guillermo at bedside and signed consents with patient  ADRIANA Sebastian PGY-4

## 2022-01-12 NOTE — OB RN DELIVERY SUMMARY - NS_SEPSISRSKCALC_OBGYN_ALL_OB_FT
EOS calculated successfully. EOS Risk Factor: 0.5/1000 live births ([facility] national incidence); GA=38w6d; Temp=98.6; ROM=12.867; GBS='Positive'; Antibiotics='GBS specific antibiotics > 2 hrs prior to birth'

## 2022-01-12 NOTE — OB RN DELIVERY SUMMARY - APGAR COMPLETED BY
----- Message from Devyn Brannon sent at 10/13/2021  3:22 PM CDT -----  Subject: Message to Provider    QUESTIONS  Information for Provider? Patient would like a call back to find out if   her provider will be sending in a prescription for her bladder infection   and she also would like a call back regarding her lab results. ---------------------------------------------------------------------------  --------------  Anika SALDANA  What is the best way for the office to contact you? OK to leave message on   voicemail  Preferred Call Back Phone Number? [phone]  ---------------------------------------------------------------------------  --------------  SCRIPT ANSWERS  Relationship to Patient?  Self Halima/Pediatrician

## 2022-01-13 LAB
BASOPHILS # BLD AUTO: 0.02 K/UL — SIGNIFICANT CHANGE UP (ref 0–0.2)
BASOPHILS NFR BLD AUTO: 0.2 % — SIGNIFICANT CHANGE UP (ref 0–2)
EOSINOPHIL # BLD AUTO: 0.16 K/UL — SIGNIFICANT CHANGE UP (ref 0–0.5)
EOSINOPHIL NFR BLD AUTO: 1.2 % — SIGNIFICANT CHANGE UP (ref 0–6)
HCT VFR BLD CALC: 28.3 % — LOW (ref 34.5–45)
HGB BLD-MCNC: 9.1 G/DL — LOW (ref 11.5–15.5)
IANC: 9 K/UL — HIGH (ref 1.5–8.5)
IMM GRANULOCYTES NFR BLD AUTO: 0.5 % — SIGNIFICANT CHANGE UP (ref 0–1.5)
LYMPHOCYTES # BLD AUTO: 2.71 K/UL — SIGNIFICANT CHANGE UP (ref 1–3.3)
LYMPHOCYTES # BLD AUTO: 21.1 % — SIGNIFICANT CHANGE UP (ref 13–44)
MAGNESIUM SERPL-MCNC: 5.3 MG/DL — HIGH (ref 1.6–2.6)
MCHC RBC-ENTMCNC: 31.2 PG — SIGNIFICANT CHANGE UP (ref 27–34)
MCHC RBC-ENTMCNC: 32.2 GM/DL — SIGNIFICANT CHANGE UP (ref 32–36)
MCV RBC AUTO: 96.9 FL — SIGNIFICANT CHANGE UP (ref 80–100)
MONOCYTES # BLD AUTO: 0.87 K/UL — SIGNIFICANT CHANGE UP (ref 0–0.9)
MONOCYTES NFR BLD AUTO: 6.8 % — SIGNIFICANT CHANGE UP (ref 2–14)
NEUTROPHILS # BLD AUTO: 9 K/UL — HIGH (ref 1.8–7.4)
NEUTROPHILS NFR BLD AUTO: 70.2 % — SIGNIFICANT CHANGE UP (ref 43–77)
NRBC # BLD: 0 /100 WBCS — SIGNIFICANT CHANGE UP
NRBC # FLD: 0 K/UL — SIGNIFICANT CHANGE UP
PLATELET # BLD AUTO: 203 K/UL — SIGNIFICANT CHANGE UP (ref 150–400)
RBC # BLD: 2.92 M/UL — LOW (ref 3.8–5.2)
RBC # FLD: 13.8 % — SIGNIFICANT CHANGE UP (ref 10.3–14.5)
WBC # BLD: 12.82 K/UL — HIGH (ref 3.8–10.5)
WBC # FLD AUTO: 12.82 K/UL — HIGH (ref 3.8–10.5)

## 2022-01-13 RX ORDER — IBUPROFEN 200 MG
600 TABLET ORAL EVERY 6 HOURS
Refills: 0 | Status: DISCONTINUED | OUTPATIENT
Start: 2022-01-13 | End: 2022-01-14

## 2022-01-13 RX ADMIN — Medication 975 MILLIGRAM(S): at 03:45

## 2022-01-13 RX ADMIN — Medication 600 MILLIGRAM(S): at 17:30

## 2022-01-13 RX ADMIN — Medication 600 MILLIGRAM(S): at 18:00

## 2022-01-13 RX ADMIN — Medication 975 MILLIGRAM(S): at 09:15

## 2022-01-13 RX ADMIN — Medication 975 MILLIGRAM(S): at 21:50

## 2022-01-13 RX ADMIN — SIMETHICONE 80 MILLIGRAM(S): 80 TABLET, CHEWABLE ORAL at 12:48

## 2022-01-13 RX ADMIN — Medication 975 MILLIGRAM(S): at 08:45

## 2022-01-13 RX ADMIN — HEPARIN SODIUM 5000 UNIT(S): 5000 INJECTION INTRAVENOUS; SUBCUTANEOUS at 08:45

## 2022-01-13 RX ADMIN — Medication 600 MILLIGRAM(S): at 12:45

## 2022-01-13 RX ADMIN — Medication 600 MILLIGRAM(S): at 06:00

## 2022-01-13 RX ADMIN — Medication 975 MILLIGRAM(S): at 21:24

## 2022-01-13 RX ADMIN — Medication 30 MILLIGRAM(S): at 00:19

## 2022-01-13 RX ADMIN — HEPARIN SODIUM 5000 UNIT(S): 5000 INJECTION INTRAVENOUS; SUBCUTANEOUS at 21:24

## 2022-01-13 RX ADMIN — Medication 600 MILLIGRAM(S): at 13:15

## 2022-01-13 RX ADMIN — Medication 975 MILLIGRAM(S): at 03:00

## 2022-01-13 RX ADMIN — Medication 30 MILLIGRAM(S): at 00:30

## 2022-01-13 NOTE — PROGRESS NOTE ADULT - ASSESSMENT
A/P: 30yo  sPEC POD#1 s/p pLTCS for Cat 2 tracing.  Patient is stable and doing well post-operatively.    - sPEC: BP wnl overnight, ctm q4hrs, no signs or symptoms of PEC. S/p Mg  - Continue regular diet.  - Increase ambulation, SCDs when not ambulating, Heparin for DVT ppx  - Continue motrin, tylenol, oxycodone PRN for pain control  - F/u AM CBC    KELLIE Gonzales PGY1 A/P: 32yo  sPEC POD#1 s/p pLTCS for Cat 2 tracing.  Patient is stable and doing well post-operatively.    - sPEC: BP wnl overnight, ctm q4hrs, no signs or symptoms of PEC. S/p Mg  - c/w Procardia 30XL  - Continue regular diet.  - Increase ambulation, SCDs when not ambulating, Heparin for DVT ppx  - Continue motrin, tylenol, oxycodone PRN for pain control  - F/u AM CBC    KELLIE Gonzales PGY1

## 2022-01-13 NOTE — LACTATION INITIAL EVALUATION - LACTATION INTERVENTIONS
Reviewed pages 35-45 in the POST PARTUM book./initiate/review safe skin-to-skin/initiate/review hand expression/initiate/review techniques for position and latch/reviewed components of an effective feeding and at least 8 effective feedings per day required/reviewed importance of monitoring infant diapers, the breastfeeding log, and minimum output each day/reviewed risks of unnecessary formula supplementation/reviewed risks of artificial nipples/reviewed benefits and recommendations for rooming in/reviewed feeding on demand/by cue at least 8 times a day/recommended follow-up with pediatrician within 24 hours of discharge

## 2022-01-14 VITALS
TEMPERATURE: 99 F | HEART RATE: 76 BPM | DIASTOLIC BLOOD PRESSURE: 68 MMHG | RESPIRATION RATE: 18 BRPM | OXYGEN SATURATION: 99 % | SYSTOLIC BLOOD PRESSURE: 122 MMHG

## 2022-01-14 RX ORDER — NIFEDIPINE 30 MG
1 TABLET, EXTENDED RELEASE 24 HR ORAL
Qty: 30 | Refills: 0
Start: 2022-01-14 | End: 2022-02-12

## 2022-01-14 RX ADMIN — Medication 975 MILLIGRAM(S): at 09:00

## 2022-01-14 RX ADMIN — Medication 600 MILLIGRAM(S): at 06:15

## 2022-01-14 RX ADMIN — Medication 600 MILLIGRAM(S): at 06:45

## 2022-01-14 RX ADMIN — Medication 600 MILLIGRAM(S): at 13:31

## 2022-01-14 RX ADMIN — Medication 975 MILLIGRAM(S): at 09:30

## 2022-01-14 RX ADMIN — Medication 600 MILLIGRAM(S): at 12:51

## 2022-01-14 RX ADMIN — HEPARIN SODIUM 5000 UNIT(S): 5000 INJECTION INTRAVENOUS; SUBCUTANEOUS at 09:00

## 2022-01-14 NOTE — PROGRESS NOTE ADULT - ASSESSMENT
A/P: 30yo POD#2 s/p LTCS for Cat 2 tracing c;b sPEC s/p [place] for seizure ppx.  Patient is stable and doing well post-operatively.      #sPEC  - s/p magnesium for seizure ppx   - BP's wnl overnight, c/w Procardia 30XL qd  - HELLP wnl P/C 0.2  - cont to monitor for S/s sPEC    #routine PP care  - Continue regular diet.  - Increase ambulation.  - Continue motrin, tylenol, oxycodone PRN for pain control.   - Discharge planning    Ashley Cast, PGY1

## 2022-01-14 NOTE — PROGRESS NOTE ADULT - SUBJECTIVE AND OBJECTIVE BOX
OB Progress Note:  Delivery, POD#1    S: 32yo  sPEC POD#1 s/p pLTCS for Cat 2 tracing. Her pain is well controlled. She is tolerating a regular diet and passing flatus. Denies N/V. Denies CP/SOB/lightheadedness/dizziness. She is ambulating without difficulty. Oliveira removed at 3am, patient has not yet voided.    O:   Vital Signs Last 24 Hrs  T(C): 36.8 (2022 06:26), Max: 37.1 (2022 15:08)  T(F): 98.3 (2022 06:26), Max: 98.7 (2022 15:08)  HR: 64 (2022 06:26) (64 - 91)  BP: 115/64 (2022 06:26) (114/70 - 139/89)  BP(mean): 92 (2022 07:30) (92 - 92)  RR: 16 (2022 06:26) (13 - 18)  SpO2: 98% (2022 06:26) (95% - 99%)    Labs:  Blood type: O Positive  Rubella IgG: RPR: Negative                          12.2   18.86<H> >-----------< 203    (  @ 06:43 )             36.3                        13.6   12.26<H> >-----------< 219    (  @ 21:13 )             40.4                        12.9   9.56 >-----------< 211    (  @ 16:23 )             39.2                        12.6   9.22 >-----------< 195    (  @ 09:40 )             39.9                        12.4   10.18 >-----------< 206    (  @ 03:48 )             36.3                        13.2   11.89<H> >-----------< 222    ( 01-10 @ 21:57 )             40.3                        12.5   11.15<H> >-----------< 202    ( 01-10 @ 15:18 )             35.9                        13.1   11.61<H> >-----------< 211    ( 01-10 @ 09:27 )             38.6    22 @ 21:14      136  |  99  |  4<L>  ----------------------------<  83  3.9   |  23  |  0.74    22 @ 16:23      134<L>  |  98  |  5<L>  ----------------------------<  114<H>  3.8   |  23  |  0.78    22 @ 09:40      136  |  100  |  5<L>  ----------------------------<  82  4.0   |  24  |  0.76    22 @ 03:48      135  |  100  |  6<L>  ----------------------------<  83  3.8   |  23  |  0.76    01-10-22 @ 21:57      136  |  100  |  3<L>  ----------------------------<  104<H>  4.0   |  23  |  0.81    01-10-22 @ 15:18      136  |  101  |  6<L>  ----------------------------<  83  3.9   |  23  |  0.74    01-10-22 @ 09:27      135  |  102  |  4<L>  ----------------------------<  84  3.8   |  23  |  0.75        Ca    7.9<L>      2022 21:14  Ca    8.0<L>      2022 16:23  Ca    7.8<L>      2022 09:40  Ca    7.7<L>      2022 03:48  Ca    8.1<L>      10 2 21:57  Ca    7.6<L>      10 Christoph 2022 15:18  Ca    7.8<L>      10 Christoph 2022 09:27  Mg     5.30<H>     13  Mg     5.50<H>       Mg     6.30<H>       Mg     5.40<H>       Mg     5.40<H>     -11  Mg     5.40<H>       Mg     5.40<H>     11  Mg     5.50<H>     -10  Mg     6.90<H>     01-10  Mg     6.50<H>     01-10    TPro  6.6  /  Alb  3.4  /  TBili  0.3  /  DBili  x   /  AST  25  /  ALT  16  /  AlkPhos  201<H>  22 @ 21:14  TPro  6.6  /  Alb  3.4  /  TBili  0.3  /  DBili  x   /  AST  28  /  ALT  18  /  AlkPhos  196<H>  22 @ 16:23  TPro  6.3  /  Alb  3.2<L>  /  TBili  0.2  /  DBili  x   /  AST  28  /  ALT  20  /  AlkPhos  184<H>  22 @ 09:40  TPro  6.2  /  Alb  3.1<L>  /  TBili  0.3  /  DBili  x   /  AST  28  /  ALT  13  /  AlkPhos  177<H>  22 @ 03:48  TPro  6.9  /  Alb  3.6  /  TBili  0.2  /  DBili  x   /  AST  31  /  ALT  22  /  AlkPhos  204<H>  01-10-22 @ 21:57  TPro  6.4  /  Alb  3.4  /  TBili  0.2  /  DBili  x   /  AST  22  /  ALT  16  /  AlkPhos  185<H>  01-10-22 @ 15:18  TPro  6.8  /  Alb  3.6  /  TBili  0.2  /  DBili  x   /  AST  25  /  ALT  17  /  AlkPhos  190<H>  01-10-22 @ 09:27          acetaminophen     Tablet .. 975 milliGRAM(s) Oral <User Schedule>  butorphanol Injectable 0.125 milliGRAM(s) IV Push every 6 hours PRN  diphenhydrAMINE 25 milliGRAM(s) Oral every 6 hours PRN  diphenhydrAMINE Injectable 25 milliGRAM(s) IV Push every 4 hours PRN  diphtheria/tetanus/pertussis (acellular) Vaccine (ADAcel) 0.5 milliLiter(s) IntraMuscular once  heparin   Injectable 5000 Unit(s) SubCutaneous every 12 hours  HYDROmorphone  Injectable 1 milliGRAM(s) IV Push every 3 hours PRN  HYDROmorphone  Injectable 0.5 milliGRAM(s) IV Push every 3 hours PRN  ibuprofen  Tablet. 600 milliGRAM(s) Oral every 6 hours  lactated ringers. 1000 milliLiter(s) IV Continuous <Continuous>  lactated ringers. 1000 milliLiter(s) IV Continuous <Continuous>  lactated ringers. 1000 milliLiter(s) IV Continuous <Continuous>  lanolin Ointment 1 Application(s) Topical every 6 hours PRN  magnesium hydroxide Suspension 30 milliLiter(s) Oral two times a day PRN  misoprostol 25 MICROGram(s) Vaginal every 3 hours  morphine PF Spinal 0.1 milliGRAM(s) IntraThecal. once  naloxone Injectable 0.1 milliGRAM(s) IV Push every 3 minutes PRN  ondansetron Injectable 4 milliGRAM(s) IV Push every 6 hours PRN  oxyCODONE    IR 5 milliGRAM(s) Oral every 3 hours PRN  oxyCODONE    IR 5 milliGRAM(s) Oral once PRN  oxyCODONE    IR 5 milliGRAM(s) Oral every 3 hours PRN  oxyCODONE    IR 10 milliGRAM(s) Oral every 3 hours PRN  oxytocin Infusion 62.5 milliUNIT(s)/Min IV Continuous <Continuous>  oxytocin Infusion 333.333 milliUNIT(s)/Min IV Continuous <Continuous>  simethicone 80 milliGRAM(s) Chew every 4 hours PRN      PE:  General: NAD  CV: RRR  Pulm: CTAB  Abdomen: Mildly distended, appropriately tender, incision c/d/i.  Extremities: No calf tenderness, no pitting edema    
POST OP DAY  1  s/p   SECTION    SUBJECTIVE:  I'm ok, just feeling itchy.    PAIN SCALE SCORE: [x] Refer to charted pain scores    THERAPY:  [ x ] Spinal morphine   [  ] Epidural morphine   [  ] IV PCA Hydromorphone 1 mg/ml    OBJECTIVE:  Comfortable Appearing    SEDATION SCORE:	  [ x ] Alert	    [  ] Drowsy        [  ] Arousable	[  ] Asleep	[  ] Unresponsive    Side Effects:	  [  ] None	     [  ] Nausea        [ x ] Pruritus        [  ] Weakness   [  ] Numbness        ASSESSMENT/ PLAN   [   ] Discontinue         [  ] Continue    [ x ] Change to prn Analgesics as per primary service.    DOCUMENTATION & VERIFICATION OF CURRENT MEDS [ x ] Done    COMMENTS: No Headache.  Pt reports feeling itchy, ordered for nalbuphine.
Postpartum Note,  Section  She is a  31y woman who is now post-operative day: 2    Subjective:  The patient feels well.  Passed flatus  No HA, no BV  She is ambulating.   She is tolerating regular diet.  She denies nausea and vomiting.  She is voiding.  Her pain is controlled.  She reports normal postpartum bleeding    Physical exam:    Vital Signs Last 24 Hrs  T(C): 36.9 (2022 06:18), Max: 37.1 (2022 01:50)  T(F): 98.4 (2022 06:18), Max: 98.7 (2022 01:50)  HR: 75 (2022 06:18) (65 - 80)  BP: 129/84 (2022 06:18) (117/68 - 131/77)  BP(mean): --  RR: 18 (2022 06:18) (18 - 18)  SpO2: 99% (2022 06:18) (98% - 99%)    Gen: NAD  Breast: Soft, nontender, not engorged.  Abdomen: Soft, nontender, no distension , firm uterine fundus at umbilicus.  Incision: Clean, dry, and intact with steri strips  Pelvic: Normal lochia noted  Ext: No calf tenderness    LABS:                        9.1    12.82 )-----------( 203      ( 2022 06:28 )             28.3                         12.2   18.86 )-----------(       ( 2022 06:43 )             36.3       22 @ 21:14      136  |  99  |  4<L>  ----------------------------<  83  3.9   |  23  |  0.74    22 @ 16:23      134<L>  |  98  |  5<L>  ----------------------------<  114<H>  3.8   |  23  |  0.78    22 @ 09:40      136  |  100  |  5<L>  ----------------------------<  82  4.0   |  24  |  0.76        Ca    7.9<L>      2022 21:14  Ca    8.0<L>      2022 16:23  Ca    7.8<L>      2022 09:40  Mg     5.30<H>     01-13  Mg     5.50<H>     01-12  Mg     6.30<H>     01-12  Mg     5.40<H>     01-12  Mg     5.40<H>     01-11  Mg     5.40<H>     11    TPro  6.6  /  Alb  3.4  /  TBili  0.3  /  DBili  x   /  AST  25  /  ALT  16  /  AlkPhos  201<H>  22 @ 21:14  TPro  6.6  /  Alb  3.4  /  TBili  0.3  /  DBili  x   /  AST  28  /  ALT  18  /  AlkPhos  196<H>  22 @ 16:23  TPro  6.3  /  Alb  3.2<L>  /  TBili  0.2  /  DBili  x   /  AST  28  /  ALT  20  /  AlkPhos  184<H>  22 @ 09:40        Allergies    chlorhexidine containing compounds (Blisters)    Intolerances      MEDICATIONS  (STANDING):  acetaminophen     Tablet .. 975 milliGRAM(s) Oral <User Schedule>  diphtheria/tetanus/pertussis (acellular) Vaccine (ADAcel) 0.5 milliLiter(s) IntraMuscular once  heparin   Injectable 5000 Unit(s) SubCutaneous every 12 hours  ibuprofen  Tablet. 600 milliGRAM(s) Oral every 6 hours  lactated ringers. 1000 milliLiter(s) (125 mL/Hr) IV Continuous <Continuous>  lactated ringers. 1000 milliLiter(s) (62.5 mL/Hr) IV Continuous <Continuous>  lactated ringers. 1000 milliLiter(s) (62.5 mL/Hr) IV Continuous <Continuous>    MEDICATIONS  (PRN):  butorphanol Injectable 0.125 milliGRAM(s) IV Push every 6 hours PRN Pruritus  diphenhydrAMINE 25 milliGRAM(s) Oral every 6 hours PRN Pruritus  diphenhydrAMINE Injectable 25 milliGRAM(s) IV Push every 4 hours PRN Pruritus  lanolin Ointment 1 Application(s) Topical every 6 hours PRN Sore Nipples  magnesium hydroxide Suspension 30 milliLiter(s) Oral two times a day PRN Constipation  naloxone Injectable 0.1 milliGRAM(s) IV Push every 3 minutes PRN For ANY of the following changes in patient status:  A. RR LESS THAN 10 breaths per minute, B. Oxygen saturation LESS THAN 90%, C. Sedation score of 6  ondansetron Injectable 4 milliGRAM(s) IV Push every 6 hours PRN Nausea  oxyCODONE    IR 5 milliGRAM(s) Oral every 3 hours PRN Moderate to Severe Pain (4-10)  oxyCODONE    IR 5 milliGRAM(s) Oral once PRN Moderate to Severe Pain (4-10)  simethicone 80 milliGRAM(s) Chew every 4 hours PRN Gas        Assessment and Plan  POD #2   s/p  section PIH  Doing well.  Encourage ambulation.  D/C home with insrtuctions  RTI           
Postpartum Note,  Section  She is a  31y woman who is now post-operative day: 1    Subjective:  The patient feels well.  She is ambulating.   She is tolerating regular diet.  She denies nausea and vomiting.  She is voiding.  Her pain is controlled.  She reports normal postpartum bleeding.    Vital Signs Last 24 Hrs  T(C): 36.4 (2022 10:00), Max: 37.1 (2022 15:08)  T(F): 97.5 (2022 10:00), Max: 98.7 (2022 15:08)  HR: 65 (2022 10:00) (64 - 72)  BP: 126/70 (2022 10:00) (114/70 - 139/89)  BP(mean): --  RR: 18 (2022 10:00) (16 - 18)  SpO2: 98% (2022 10:00) (98% - 99%)    Physical exam:  Gen: NAD  Breast: Soft, nontender, not engorged.  Abdomen: Soft, nontender, no distension , firm uterine fundus at umbilicus.  Incision: Clean, dry, and intact with steri strips  Pelvic: Normal lochia noted  Ext: No calf tenderness    LABS:                        9.1    12.82 )-----------( 203      ( 2022 06:28 )             28.3         Allergies    chlorhexidine containing compounds (Blisters)    Intolerances      MEDICATIONS  (STANDING):  acetaminophen     Tablet .. 975 milliGRAM(s) Oral <User Schedule>  diphtheria/tetanus/pertussis (acellular) Vaccine (ADAcel) 0.5 milliLiter(s) IntraMuscular once  heparin   Injectable 5000 Unit(s) SubCutaneous every 12 hours  ibuprofen  Tablet. 600 milliGRAM(s) Oral every 6 hours  lactated ringers. 1000 milliLiter(s) (125 mL/Hr) IV Continuous <Continuous>  lactated ringers. 1000 milliLiter(s) (62.5 mL/Hr) IV Continuous <Continuous>  lactated ringers. 1000 milliLiter(s) (62.5 mL/Hr) IV Continuous <Continuous>  misoprostol 25 MICROGram(s) Vaginal every 3 hours  morphine PF Spinal 0.1 milliGRAM(s) IntraThecal. once  oxytocin Infusion 62.5 milliUNIT(s)/Min (188 mL/Hr) IV Continuous <Continuous>  oxytocin Infusion 333.333 milliUNIT(s)/Min (1000 mL/Hr) IV Continuous <Continuous>    MEDICATIONS  (PRN):  butorphanol Injectable 0.125 milliGRAM(s) IV Push every 6 hours PRN Pruritus  diphenhydrAMINE 25 milliGRAM(s) Oral every 6 hours PRN Pruritus  diphenhydrAMINE Injectable 25 milliGRAM(s) IV Push every 4 hours PRN Pruritus  lanolin Ointment 1 Application(s) Topical every 6 hours PRN Sore Nipples  magnesium hydroxide Suspension 30 milliLiter(s) Oral two times a day PRN Constipation  naloxone Injectable 0.1 milliGRAM(s) IV Push every 3 minutes PRN For ANY of the following changes in patient status:  A. RR LESS THAN 10 breaths per minute, B. Oxygen saturation LESS THAN 90%, C. Sedation score of 6  ondansetron Injectable 4 milliGRAM(s) IV Push every 6 hours PRN Nausea  oxyCODONE    IR 5 milliGRAM(s) Oral every 3 hours PRN Moderate to Severe Pain (4-10)  oxyCODONE    IR 5 milliGRAM(s) Oral once PRN Moderate to Severe Pain (4-10)  simethicone 80 milliGRAM(s) Chew every 4 hours PRN Gas        Assessment and Plan  POD #1 s/p  section  Doing well.  Encourage ambulation.        
Pt seen  No complaints  Nature and need for induction reviewed with pt.  Pain management options D/W pt.
OB Progress Note: LTCS, POD#2    S: 30yo POD#2 s/p LTCS for Cat 2 tracing s/p sPEC. Pain is well controlled. She is tolerating a regular diet and passing flatus. She is voiding spontaneously, and ambulating without difficulty. Denies CP/SOB. Denies lightheadedness/dizziness. Denies N/V. denies headache, changes in vision, epigastric pain.     O:  Vitals:  Vital Signs Last 24 Hrs  T(C): 36.9 (14 Jan 2022 06:18), Max: 37.1 (14 Jan 2022 01:50)  T(F): 98.4 (14 Jan 2022 06:18), Max: 98.7 (14 Jan 2022 01:50)  HR: 75 (14 Jan 2022 06:18) (65 - 80)  BP: 129/84 (14 Jan 2022 06:18) (117/68 - 131/77)  BP(mean): --  RR: 18 (14 Jan 2022 06:18) (18 - 18)  SpO2: 99% (14 Jan 2022 06:18) (98% - 99%)    MEDICATIONS  (STANDING):  acetaminophen     Tablet .. 975 milliGRAM(s) Oral <User Schedule>  diphtheria/tetanus/pertussis (acellular) Vaccine (ADAcel) 0.5 milliLiter(s) IntraMuscular once  heparin   Injectable 5000 Unit(s) SubCutaneous every 12 hours  ibuprofen  Tablet. 600 milliGRAM(s) Oral every 6 hours  lactated ringers. 1000 milliLiter(s) (62.5 mL/Hr) IV Continuous <Continuous>  lactated ringers. 1000 milliLiter(s) (62.5 mL/Hr) IV Continuous <Continuous>  lactated ringers. 1000 milliLiter(s) (125 mL/Hr) IV Continuous <Continuous>      MEDICATIONS  (PRN):  butorphanol Injectable 0.125 milliGRAM(s) IV Push every 6 hours PRN Pruritus  diphenhydrAMINE 25 milliGRAM(s) Oral every 6 hours PRN Pruritus  diphenhydrAMINE Injectable 25 milliGRAM(s) IV Push every 4 hours PRN Pruritus  lanolin Ointment 1 Application(s) Topical every 6 hours PRN Sore Nipples  magnesium hydroxide Suspension 30 milliLiter(s) Oral two times a day PRN Constipation  naloxone Injectable 0.1 milliGRAM(s) IV Push every 3 minutes PRN For ANY of the following changes in patient status:  A. RR LESS THAN 10 breaths per minute, B. Oxygen saturation LESS THAN 90%, C. Sedation score of 6  ondansetron Injectable 4 milliGRAM(s) IV Push every 6 hours PRN Nausea  oxyCODONE    IR 5 milliGRAM(s) Oral every 3 hours PRN Moderate to Severe Pain (4-10)  oxyCODONE    IR 5 milliGRAM(s) Oral once PRN Moderate to Severe Pain (4-10)  simethicone 80 milliGRAM(s) Chew every 4 hours PRN Gas      Labs:  Blood type: O Positive  Rubella IgG: RPR: Negative                          9.1<L>   12.82<H> >-----------< 203    ( 01-13 @ 06:28 )             28.3<L>                        12.2   18.86<H> >-----------< 203    ( 01-12 @ 06:43 )             36.3                        13.6   12.26<H> >-----------< 219    ( 01-11 @ 21:13 )             40.4                        12.9   9.56 >-----------< 211    ( 01-11 @ 16:23 )             39.2                        12.6   9.22 >-----------< 195    ( 01-11 @ 09:40 )             39.9    01-11-22 @ 21:14      136  |  99  |  4<L>  ----------------------------<  83  3.9   |  23  |  0.74    01-11-22 @ 16:23      134<L>  |  98  |  5<L>  ----------------------------<  114<H>  3.8   |  23  |  0.78    01-11-22 @ 09:40      136  |  100  |  5<L>  ----------------------------<  82  4.0   |  24  |  0.76        Ca    7.9<L>      11 Jan 2022 21:14  Ca    8.0<L>      11 Jan 2022 16:23  Ca    7.8<L>      11 Jan 2022 09:40  Mg     5.30<H>     01-13  Mg     5.50<H>     01-12  Mg     6.30<H>     01-12  Mg     5.40<H>     01-12  Mg     5.40<H>     01-11  Mg     5.40<H>     01-11    TPro  6.6  /  Alb  3.4  /  TBili  0.3  /  DBili  x   /  AST  25  /  ALT  16  /  AlkPhos  201<H>  01-11-22 @ 21:14  TPro  6.6  /  Alb  3.4  /  TBili  0.3  /  DBili  x   /  AST  28  /  ALT  18  /  AlkPhos  196<H>  01-11-22 @ 16:23  TPro  6.3  /  Alb  3.2<L>  /  TBili  0.2  /  DBili  x   /  AST  28  /  ALT  20  /  AlkPhos  184<H>  01-11-22 @ 09:40          PE:  General: NAD  Abdomen: Soft, appropriately tender, incision c/d/i.  Extremities: No erythema, no pitting edema

## 2022-01-18 ENCOUNTER — NON-APPOINTMENT (OUTPATIENT)
Age: 32
End: 2022-01-18

## 2022-01-19 PROBLEM — Z15.89 GENETIC SUSCEPTIBILITY TO OTHER DISEASE: Chronic | Status: ACTIVE | Noted: 2022-01-11

## 2022-01-24 ENCOUNTER — NON-APPOINTMENT (OUTPATIENT)
Age: 32
End: 2022-01-24

## 2022-01-25 ENCOUNTER — NON-APPOINTMENT (OUTPATIENT)
Age: 32
End: 2022-01-25

## 2022-01-26 ENCOUNTER — NON-APPOINTMENT (OUTPATIENT)
Age: 32
End: 2022-01-26

## 2022-01-26 ENCOUNTER — APPOINTMENT (OUTPATIENT)
Dept: CARDIOLOGY | Facility: CLINIC | Age: 32
End: 2022-01-26
Payer: COMMERCIAL

## 2022-01-26 VITALS
HEIGHT: 62 IN | WEIGHT: 146 LBS | HEART RATE: 86 BPM | OXYGEN SATURATION: 98 % | DIASTOLIC BLOOD PRESSURE: 80 MMHG | SYSTOLIC BLOOD PRESSURE: 122 MMHG | TEMPERATURE: 97.6 F | BODY MASS INDEX: 26.87 KG/M2

## 2022-01-26 DIAGNOSIS — Z00.00 ENCOUNTER FOR GENERAL ADULT MEDICAL EXAMINATION W/OUT ABNORMAL FINDINGS: ICD-10-CM

## 2022-01-26 DIAGNOSIS — Z82.49 FAMILY HISTORY OF ISCHEMIC HEART DISEASE AND OTHER DISEASES OF THE CIRCULATORY SYSTEM: ICD-10-CM

## 2022-01-26 PROCEDURE — 93000 ELECTROCARDIOGRAM COMPLETE: CPT

## 2022-01-26 PROCEDURE — 99204 OFFICE O/P NEW MOD 45 MIN: CPT

## 2022-01-31 NOTE — OB RN PATIENT PROFILE - NSSTATEDREASONFORADM_OBGYN_A_OB_FT
[de-identified] : S/P hospital. Dx Gastroenteritis, still having diarrhea [FreeTextEntry6] : Went to [facility] 3 nights ago for vomiting and diarrhea - diagnosed with viral gastroenteritis, didn't need IVF and no bloodwork or covid testing done\par Vomiting has stopped - no vomiting in 24 hours\par Diarrhea x 5 days - and still having loose stools.  About 5 times/day, today has had 3 episodes \par Stools are very watery, no blood\par Appetite decreased, good fluids, tolerating milk \par Normal UOP > 3 wet diapers per day\par Had fever to 100 3 days ago, no fever since\par No cough\par Slight congestion\par No recent travel or known covid contacts, no \par \par  sent md office for high bp

## 2022-02-26 NOTE — HISTORY OF PRESENT ILLNESS
[FreeTextEntry1] : Pt presents as new pt to establish care. Pt is two weeks post partum and was sent by her OB Dr. Gan for pre-eclampsia. Pt states that her BPs were running 140s/80s in the hospital after her delivery via . Pt was started on Procardia 30 mg once daily in the hospital. Pt states that she has not been keeping a log of her BPs since being discharged. Pt denies any headaches, nausea/vomiting, blurry vision.

## 2022-03-02 ENCOUNTER — APPOINTMENT (OUTPATIENT)
Dept: CARDIOLOGY | Facility: CLINIC | Age: 32
End: 2022-03-02
Payer: COMMERCIAL

## 2022-03-02 VITALS
BODY MASS INDEX: 26.87 KG/M2 | OXYGEN SATURATION: 98 % | HEART RATE: 72 BPM | TEMPERATURE: 98 F | SYSTOLIC BLOOD PRESSURE: 112 MMHG | WEIGHT: 146 LBS | HEIGHT: 62 IN | DIASTOLIC BLOOD PRESSURE: 70 MMHG

## 2022-03-02 DIAGNOSIS — G89.29 HEADACHE, UNSPECIFIED: ICD-10-CM

## 2022-03-02 DIAGNOSIS — R51.9 HEADACHE, UNSPECIFIED: ICD-10-CM

## 2022-03-02 PROCEDURE — 99214 OFFICE O/P EST MOD 30 MIN: CPT

## 2022-03-04 NOTE — HISTORY OF PRESENT ILLNESS
[FreeTextEntry1] : Pt presents today for follow up for post partum preeclampsia. Pt currently on nifedipine 30mg. She is getting values at home between 100/69- 126/76. \par \par The patient here for evaluation of high blood pressure. Patient is currently tolerating the current antihypertensive regime and they deny headaches, stiff neck, visual changes, or PND. The patient has been trying to stay on a low-sodium diet.\par \par

## 2022-03-07 ENCOUNTER — RX RENEWAL (OUTPATIENT)
Age: 32
End: 2022-03-07

## 2022-07-06 ENCOUNTER — RESULT REVIEW (OUTPATIENT)
Age: 32
End: 2022-07-06

## 2023-03-14 ENCOUNTER — APPOINTMENT (OUTPATIENT)
Dept: ANTEPARTUM | Facility: CLINIC | Age: 33
End: 2023-03-14
Payer: COMMERCIAL

## 2023-03-14 ENCOUNTER — ASOB RESULT (OUTPATIENT)
Age: 33
End: 2023-03-14

## 2023-03-14 PROCEDURE — 76811 OB US DETAILED SNGL FETUS: CPT

## 2023-05-09 ENCOUNTER — APPOINTMENT (OUTPATIENT)
Dept: ANTEPARTUM | Facility: CLINIC | Age: 33
End: 2023-05-09

## 2023-05-09 ENCOUNTER — NON-APPOINTMENT (OUTPATIENT)
Age: 33
End: 2023-05-09

## 2023-05-09 ENCOUNTER — APPOINTMENT (OUTPATIENT)
Dept: ANTEPARTUM | Facility: CLINIC | Age: 33
End: 2023-05-09
Payer: COMMERCIAL

## 2023-05-09 ENCOUNTER — ASOB RESULT (OUTPATIENT)
Age: 33
End: 2023-05-09

## 2023-05-09 PROCEDURE — 76816 OB US FOLLOW-UP PER FETUS: CPT

## 2023-05-09 PROCEDURE — 76819 FETAL BIOPHYS PROFIL W/O NST: CPT | Mod: 59

## 2023-08-01 ENCOUNTER — APPOINTMENT (OUTPATIENT)
Dept: ANTEPARTUM | Facility: CLINIC | Age: 33
End: 2023-08-01
Payer: COMMERCIAL

## 2023-08-01 ENCOUNTER — ASOB RESULT (OUTPATIENT)
Age: 33
End: 2023-08-01

## 2023-08-01 PROCEDURE — 76819 FETAL BIOPHYS PROFIL W/O NST: CPT

## 2023-08-01 PROCEDURE — 76816 OB US FOLLOW-UP PER FETUS: CPT

## 2023-08-09 ENCOUNTER — INPATIENT (INPATIENT)
Facility: HOSPITAL | Age: 33
LOS: 1 days | Discharge: HOME | End: 2023-08-11
Attending: SPECIALIST | Admitting: SPECIALIST

## 2023-08-09 ENCOUNTER — TRANSCRIPTION ENCOUNTER (OUTPATIENT)
Age: 33
End: 2023-08-09

## 2023-08-09 VITALS
HEART RATE: 75 BPM | RESPIRATION RATE: 18 BRPM | SYSTOLIC BLOOD PRESSURE: 128 MMHG | TEMPERATURE: 99 F | DIASTOLIC BLOOD PRESSURE: 87 MMHG

## 2023-08-09 DIAGNOSIS — Z98.890 OTHER SPECIFIED POSTPROCEDURAL STATES: Chronic | ICD-10-CM

## 2023-08-09 DIAGNOSIS — Z98.1 ARTHRODESIS STATUS: Chronic | ICD-10-CM

## 2023-08-09 DIAGNOSIS — O61.9 FAILED INDUCTION OF LABOR, UNSPECIFIED: ICD-10-CM

## 2023-08-09 DIAGNOSIS — O42.90 PREMATURE RUPTURE OF MEMBRANES, UNSPECIFIED AS TO LENGTH OF TIME BETWEEN RUPTURE AND ONSET OF LABOR, UNSPECIFIED WEEKS OF GESTATION: ICD-10-CM

## 2023-08-09 DIAGNOSIS — O26.899 OTHER SPECIFIED PREGNANCY RELATED CONDITIONS, UNSPECIFIED TRIMESTER: ICD-10-CM

## 2023-08-09 LAB
ALBUMIN SERPL ELPH-MCNC: 3.7 G/DL — SIGNIFICANT CHANGE UP (ref 3.3–5)
ALP SERPL-CCNC: 166 U/L — HIGH (ref 40–120)
ALT FLD-CCNC: 11 U/L — SIGNIFICANT CHANGE UP (ref 4–33)
ANION GAP SERPL CALC-SCNC: 12 MMOL/L — SIGNIFICANT CHANGE UP (ref 7–14)
APPEARANCE UR: CLEAR — SIGNIFICANT CHANGE UP
APTT BLD: 30.2 SEC — SIGNIFICANT CHANGE UP (ref 24.5–35.6)
AST SERPL-CCNC: 19 U/L — SIGNIFICANT CHANGE UP (ref 4–32)
BACTERIA # UR AUTO: NEGATIVE /HPF — SIGNIFICANT CHANGE UP
BASOPHILS # BLD AUTO: 0.03 K/UL — SIGNIFICANT CHANGE UP (ref 0–0.2)
BASOPHILS NFR BLD AUTO: 0.2 % — SIGNIFICANT CHANGE UP (ref 0–2)
BILIRUB SERPL-MCNC: <0.2 MG/DL — SIGNIFICANT CHANGE UP (ref 0.2–1.2)
BILIRUB UR-MCNC: NEGATIVE — SIGNIFICANT CHANGE UP
BLD GP AB SCN SERPL QL: NEGATIVE — SIGNIFICANT CHANGE UP
BUN SERPL-MCNC: 10 MG/DL — SIGNIFICANT CHANGE UP (ref 7–23)
CALCIUM SERPL-MCNC: 9.3 MG/DL — SIGNIFICANT CHANGE UP (ref 8.4–10.5)
CAST: 0 /LPF — SIGNIFICANT CHANGE UP (ref 0–4)
CHLORIDE SERPL-SCNC: 102 MMOL/L — SIGNIFICANT CHANGE UP (ref 98–107)
CO2 SERPL-SCNC: 23 MMOL/L — SIGNIFICANT CHANGE UP (ref 22–31)
COLOR SPEC: YELLOW — SIGNIFICANT CHANGE UP
CREAT ?TM UR-MCNC: 65 MG/DL — SIGNIFICANT CHANGE UP
CREAT SERPL-MCNC: 0.57 MG/DL — SIGNIFICANT CHANGE UP (ref 0.5–1.3)
DIFF PNL FLD: ABNORMAL
EGFR: 125 ML/MIN/1.73M2 — SIGNIFICANT CHANGE UP
EOSINOPHIL # BLD AUTO: 0.12 K/UL — SIGNIFICANT CHANGE UP (ref 0–0.5)
EOSINOPHIL NFR BLD AUTO: 0.8 % — SIGNIFICANT CHANGE UP (ref 0–6)
FIBRINOGEN PPP-MCNC: 351 MG/DL — SIGNIFICANT CHANGE UP (ref 200–465)
GLUCOSE SERPL-MCNC: 89 MG/DL — SIGNIFICANT CHANGE UP (ref 70–99)
GLUCOSE UR QL: NEGATIVE MG/DL — SIGNIFICANT CHANGE UP
HCT VFR BLD CALC: 38.5 % — SIGNIFICANT CHANGE UP (ref 34.5–45)
HGB BLD-MCNC: 13.2 G/DL — SIGNIFICANT CHANGE UP (ref 11.5–15.5)
IANC: 11.21 K/UL — HIGH (ref 1.8–7.4)
IMM GRANULOCYTES NFR BLD AUTO: 0.6 % — SIGNIFICANT CHANGE UP (ref 0–0.9)
INR BLD: <0.9 RATIO — SIGNIFICANT CHANGE UP (ref 0.85–1.18)
KETONES UR-MCNC: NEGATIVE MG/DL — SIGNIFICANT CHANGE UP
LDH SERPL L TO P-CCNC: 207 U/L — SIGNIFICANT CHANGE UP (ref 135–225)
LEUKOCYTE ESTERASE UR-ACNC: NEGATIVE — SIGNIFICANT CHANGE UP
LYMPHOCYTES # BLD AUTO: 14.7 % — SIGNIFICANT CHANGE UP (ref 13–44)
LYMPHOCYTES # BLD AUTO: 2.1 K/UL — SIGNIFICANT CHANGE UP (ref 1–3.3)
MCHC RBC-ENTMCNC: 31.7 PG — SIGNIFICANT CHANGE UP (ref 27–34)
MCHC RBC-ENTMCNC: 34.3 GM/DL — SIGNIFICANT CHANGE UP (ref 32–36)
MCV RBC AUTO: 92.3 FL — SIGNIFICANT CHANGE UP (ref 80–100)
MONOCYTES # BLD AUTO: 0.75 K/UL — SIGNIFICANT CHANGE UP (ref 0–0.9)
MONOCYTES NFR BLD AUTO: 5.2 % — SIGNIFICANT CHANGE UP (ref 2–14)
NEUTROPHILS # BLD AUTO: 11.21 K/UL — HIGH (ref 1.8–7.4)
NEUTROPHILS NFR BLD AUTO: 78.5 % — HIGH (ref 43–77)
NITRITE UR-MCNC: NEGATIVE — SIGNIFICANT CHANGE UP
NRBC # BLD: 0 /100 WBCS — SIGNIFICANT CHANGE UP (ref 0–0)
NRBC # FLD: 0 K/UL — SIGNIFICANT CHANGE UP (ref 0–0)
PH UR: 6 — SIGNIFICANT CHANGE UP (ref 5–8)
PLATELET # BLD AUTO: 223 K/UL — SIGNIFICANT CHANGE UP (ref 150–400)
POTASSIUM SERPL-MCNC: 4 MMOL/L — SIGNIFICANT CHANGE UP (ref 3.5–5.3)
POTASSIUM SERPL-SCNC: 4 MMOL/L — SIGNIFICANT CHANGE UP (ref 3.5–5.3)
PROT ?TM UR-MCNC: 7 MG/DL — SIGNIFICANT CHANGE UP
PROT ?TM UR-MCNC: 7 MG/DL — SIGNIFICANT CHANGE UP
PROT SERPL-MCNC: 7 G/DL — SIGNIFICANT CHANGE UP (ref 6–8.3)
PROT UR-MCNC: NEGATIVE MG/DL — SIGNIFICANT CHANGE UP
PROT/CREAT UR-RTO: 0.1 RATIO — SIGNIFICANT CHANGE UP (ref 0–0.2)
PROTHROM AB SERPL-ACNC: 9.7 SEC — SIGNIFICANT CHANGE UP (ref 9.5–13)
RBC # BLD: 4.17 M/UL — SIGNIFICANT CHANGE UP (ref 3.8–5.2)
RBC # FLD: 13.2 % — SIGNIFICANT CHANGE UP (ref 10.3–14.5)
RBC CASTS # UR COMP ASSIST: 0 /HPF — SIGNIFICANT CHANGE UP (ref 0–4)
RH IG SCN BLD-IMP: POSITIVE — SIGNIFICANT CHANGE UP
SODIUM SERPL-SCNC: 137 MMOL/L — SIGNIFICANT CHANGE UP (ref 135–145)
SP GR SPEC: 1.02 — SIGNIFICANT CHANGE UP (ref 1–1.03)
SQUAMOUS # UR AUTO: 3 /HPF — SIGNIFICANT CHANGE UP (ref 0–5)
T PALLIDUM AB TITR SER: NEGATIVE — SIGNIFICANT CHANGE UP
URATE SERPL-MCNC: 4.4 MG/DL — SIGNIFICANT CHANGE UP (ref 2.5–7)
UROBILINOGEN FLD QL: 0.2 MG/DL — SIGNIFICANT CHANGE UP (ref 0.2–1)
WBC # BLD: 14.29 K/UL — HIGH (ref 3.8–10.5)
WBC # FLD AUTO: 14.29 K/UL — HIGH (ref 3.8–10.5)
WBC UR QL: 2 /HPF — SIGNIFICANT CHANGE UP (ref 0–5)

## 2023-08-09 RX ORDER — OXYTOCIN 10 UNIT/ML
2 VIAL (ML) INJECTION
Qty: 30 | Refills: 0 | Status: DISCONTINUED | OUTPATIENT
Start: 2023-08-09 | End: 2023-08-09

## 2023-08-09 RX ORDER — LANOLIN
1 OINTMENT (GRAM) TOPICAL EVERY 6 HOURS
Refills: 0 | Status: DISCONTINUED | OUTPATIENT
Start: 2023-08-09 | End: 2023-08-11

## 2023-08-09 RX ORDER — OXYCODONE HYDROCHLORIDE 5 MG/1
5 TABLET ORAL ONCE
Refills: 0 | Status: DISCONTINUED | OUTPATIENT
Start: 2023-08-09 | End: 2023-08-11

## 2023-08-09 RX ORDER — SODIUM CHLORIDE 9 MG/ML
1000 INJECTION, SOLUTION INTRAVENOUS
Refills: 0 | Status: DISCONTINUED | OUTPATIENT
Start: 2023-08-09 | End: 2023-08-09

## 2023-08-09 RX ORDER — ONDANSETRON 8 MG/1
4 TABLET, FILM COATED ORAL EVERY 6 HOURS
Refills: 0 | Status: DISCONTINUED | OUTPATIENT
Start: 2023-08-09 | End: 2023-08-10

## 2023-08-09 RX ORDER — OXYTOCIN 10 UNIT/ML
1 VIAL (ML) INJECTION
Qty: 30 | Refills: 0 | Status: DISCONTINUED | OUTPATIENT
Start: 2023-08-09 | End: 2023-08-09

## 2023-08-09 RX ORDER — OXYTOCIN 10 UNIT/ML
333.33 VIAL (ML) INJECTION
Qty: 20 | Refills: 0 | Status: DISCONTINUED | OUTPATIENT
Start: 2023-08-09 | End: 2023-08-09

## 2023-08-09 RX ORDER — SODIUM CHLORIDE 9 MG/ML
3 INJECTION INTRAMUSCULAR; INTRAVENOUS; SUBCUTANEOUS EVERY 8 HOURS
Refills: 0 | Status: DISCONTINUED | OUTPATIENT
Start: 2023-08-09 | End: 2023-08-11

## 2023-08-09 RX ORDER — OXYCODONE HYDROCHLORIDE 5 MG/1
5 TABLET ORAL
Refills: 0 | Status: DISCONTINUED | OUTPATIENT
Start: 2023-08-09 | End: 2023-08-11

## 2023-08-09 RX ORDER — ACETAMINOPHEN 500 MG
975 TABLET ORAL
Refills: 0 | Status: DISCONTINUED | OUTPATIENT
Start: 2023-08-09 | End: 2023-08-11

## 2023-08-09 RX ORDER — PRAMOXINE HYDROCHLORIDE 150 MG/15G
1 AEROSOL, FOAM RECTAL EVERY 4 HOURS
Refills: 0 | Status: DISCONTINUED | OUTPATIENT
Start: 2023-08-09 | End: 2023-08-11

## 2023-08-09 RX ORDER — DIPHENHYDRAMINE HCL 50 MG
25 CAPSULE ORAL EVERY 6 HOURS
Refills: 0 | Status: DISCONTINUED | OUTPATIENT
Start: 2023-08-09 | End: 2023-08-11

## 2023-08-09 RX ORDER — OXYTOCIN 10 UNIT/ML
333.33 VIAL (ML) INJECTION
Qty: 20 | Refills: 0 | Status: DISCONTINUED | OUTPATIENT
Start: 2023-08-09 | End: 2023-08-10

## 2023-08-09 RX ORDER — MAGNESIUM HYDROXIDE 400 MG/1
30 TABLET, CHEWABLE ORAL
Refills: 0 | Status: DISCONTINUED | OUTPATIENT
Start: 2023-08-09 | End: 2023-08-11

## 2023-08-09 RX ORDER — FENTANYL CITRATE 50 UG/ML
30 INJECTION INTRAVENOUS
Refills: 0 | Status: DISCONTINUED | OUTPATIENT
Start: 2023-08-09 | End: 2023-08-10

## 2023-08-09 RX ORDER — TETANUS TOXOID, REDUCED DIPHTHERIA TOXOID AND ACELLULAR PERTUSSIS VACCINE, ADSORBED 5; 2.5; 8; 8; 2.5 [IU]/.5ML; [IU]/.5ML; UG/.5ML; UG/.5ML; UG/.5ML
0.5 SUSPENSION INTRAMUSCULAR ONCE
Refills: 0 | Status: DISCONTINUED | OUTPATIENT
Start: 2023-08-09 | End: 2023-08-11

## 2023-08-09 RX ORDER — BENZOCAINE 10 %
1 GEL (GRAM) MUCOUS MEMBRANE EVERY 6 HOURS
Refills: 0 | Status: DISCONTINUED | OUTPATIENT
Start: 2023-08-09 | End: 2023-08-11

## 2023-08-09 RX ORDER — SIMETHICONE 80 MG/1
80 TABLET, CHEWABLE ORAL EVERY 4 HOURS
Refills: 0 | Status: DISCONTINUED | OUTPATIENT
Start: 2023-08-09 | End: 2023-08-11

## 2023-08-09 RX ORDER — NALOXONE HYDROCHLORIDE 4 MG/.1ML
0.1 SPRAY NASAL
Refills: 0 | Status: DISCONTINUED | OUTPATIENT
Start: 2023-08-09 | End: 2023-08-10

## 2023-08-09 RX ORDER — HYDROCORTISONE 1 %
1 OINTMENT (GRAM) TOPICAL EVERY 6 HOURS
Refills: 0 | Status: DISCONTINUED | OUTPATIENT
Start: 2023-08-09 | End: 2023-08-11

## 2023-08-09 RX ORDER — KETOROLAC TROMETHAMINE 30 MG/ML
30 SYRINGE (ML) INJECTION ONCE
Refills: 0 | Status: DISCONTINUED | OUTPATIENT
Start: 2023-08-09 | End: 2023-08-10

## 2023-08-09 RX ORDER — AER TRAVELER 0.5 G/1
1 SOLUTION RECTAL; TOPICAL EVERY 4 HOURS
Refills: 0 | Status: DISCONTINUED | OUTPATIENT
Start: 2023-08-09 | End: 2023-08-11

## 2023-08-09 RX ORDER — DIBUCAINE 1 %
1 OINTMENT (GRAM) RECTAL EVERY 6 HOURS
Refills: 0 | Status: DISCONTINUED | OUTPATIENT
Start: 2023-08-09 | End: 2023-08-11

## 2023-08-09 RX ORDER — ACETAMINOPHEN 500 MG
1000 TABLET ORAL ONCE
Refills: 0 | Status: COMPLETED | OUTPATIENT
Start: 2023-08-09 | End: 2023-08-09

## 2023-08-09 RX ORDER — IBUPROFEN 200 MG
600 TABLET ORAL EVERY 6 HOURS
Refills: 0 | Status: COMPLETED | OUTPATIENT
Start: 2023-08-09 | End: 2024-07-07

## 2023-08-09 RX ADMIN — SODIUM CHLORIDE 125 MILLILITER(S): 9 INJECTION, SOLUTION INTRAVENOUS at 13:43

## 2023-08-09 RX ADMIN — Medication 1 MILLIUNIT(S)/MIN: at 13:43

## 2023-08-09 RX ADMIN — SODIUM CHLORIDE 125 MILLILITER(S): 9 INJECTION, SOLUTION INTRAVENOUS at 09:53

## 2023-08-09 RX ADMIN — FENTANYL CITRATE 30 MILLILITER(S): 50 INJECTION INTRAVENOUS at 09:08

## 2023-08-09 RX ADMIN — Medication 400 MILLIGRAM(S): at 23:02

## 2023-08-09 NOTE — OB RN TRIAGE NOTE - FALL HARM RISK - UNIVERSAL INTERVENTIONS
Bed in lowest position, wheels locked, appropriate side rails in place/Call bell, personal items and telephone in reach/Instruct patient to call for assistance before getting out of bed or chair/Non-slip footwear when patient is out of bed/[place] to call system/Physically safe environment - no spills, clutter or unnecessary equipment/Purposeful Proactive Rounding/Room/bathroom lighting operational, light cord in reach

## 2023-08-09 NOTE — CHART NOTE - NSCHARTNOTEFT_GEN_A_CORE
R3 Chart Note    Notified by RN patient with history of malignant hyperthermia in sister. Patient confirmed family history in addition to past medical history of scoliosis with spinal fusion and states she was told in the past that she would not be able to get an epidural. Previously had spinal done last pregnancy in 2022 when she had a primary  for category II tracing. Discussed with patient that given fusion if she needed to have an emergent surgery she would require general anesthesia. Patient voices understanding.    Relayed information to anesthesiologist on call. Anesthesia aware.    Ariane Cartagena  PGY3 R3 Chart Note    Notified by RN patient with history of malignant hyperthermia in sister. Patient confirmed family history in addition to past medical history of scoliosis with spinal fusion and states she was told in the past that she would not be able to get an epidural. Previously had spinal done last pregnancy in 2022 when she had a primary  for category II tracing. Discussed with patient that given fusion if she needed to have an emergent surgery she would require general anesthesia. Patient voices understanding.    Relayed information to anesthesiologist on call. Anesthesia aware. To be signed out to oncoming L&D team.    Ariane Cartagena  PGY3

## 2023-08-09 NOTE — OB PROVIDER H&P - ASSESSMENT
Evidence of rupture of membranes, discussed findings with Dr. Cueto  -Admit to L&D for TOLAC  -Routine and HELLP labs ordered   -2units PRBCs ordered   -Plan for expectant management

## 2023-08-09 NOTE — OB PROVIDER LABOR PROGRESS NOTE - ASSESSMENT
Pt is a 32yo  admitted for IOL for PROM s/p CB and making cervical change.    - Continue cont EFM, toco, IVF  - Continue pitocin infusion  - PCA in place    Silvia Alcala MD PGY1
Pt is a 34yo  admitted for IOL for PROM now s/p CB and making cervical change.    - Continue cont EFM, toco, IVF  - Continue pitocin infusion, now @12  - PCA in place    Slivia Alcala MD PGY1
32 yo  at 41/3 weeks, TOLAC PROM from 1 am  - cervical balloon placed without incident. 60cc/60cc instilled. pt tolerated well  - consider pitocin when able  - cont to monitor EFM/toco    d/w Dr Shavon mcdonald PA-C
34yo  @41w3d IOL 2/2 PROM@1a, TOLAC. Patient stable and progressing well, but in pain despite PCA analgesia.     Plan  - Start pushing   - continue pit    d/w Dr. Susy Boyer PGY4

## 2023-08-09 NOTE — OB PROVIDER H&P - NSHPPHYSICALEXAM_GEN_ALL_CORE
ICU Vital Signs Last 24 Hrs  T(C): 37.0 (09 Aug 2023 03:06), Max: 37 (09 Aug 2023 02:55)  T(F): 98.6 (09 Aug 2023 03:06), Max: 98.6 (09 Aug 2023 02:55)  HR: 75 (09 Aug 2023 03:05) (75 - 75)  BP: 128/87 (09 Aug 2023 03:05) (128/87 - 128/87)  BP(mean): --  ABP: --  ABP(mean): --  RR: 18 (09 Aug 2023 02:55) (18 - 18)  SpO2: --    O2 Parameters below as of 09 Aug 2023 02:55  Patient On (Oxygen Delivery Method): room air    Abdomen soft nontender  Speculum: Positive pooling, nitrazine, and fern   SVE: 1.5/60/-3  TAS: Cephalic presentation   GBS: Neg. (7/3/23)  EFW: 3487gms (ATU scan 8/1/23)   Category I   Yemi every 3-5mins ICU Vital Signs Last 24 Hrs  T(C): 37.0 (09 Aug 2023 03:06), Max: 37 (09 Aug 2023 02:55)  T(F): 98.6 (09 Aug 2023 03:06), Max: 98.6 (09 Aug 2023 02:55)  HR: 75 (09 Aug 2023 03:05) (75 - 75)  BP: 128/87 (09 Aug 2023 03:05) (128/87 - 128/87)  BP(mean): --  ABP: --  ABP(mean): --  RR: 18 (09 Aug 2023 02:55) (18 - 18)  SpO2: --    O2 Parameters below as of 09 Aug 2023 02:55  Patient On (Oxygen Delivery Method): room air    Abdomen soft nontender  Speculum: Positive pooling, nitrazine, and fern   SVE: 1.5/60/-3  TAS: Cephalic presentation   Image saved to ASOB   GBS: Neg. (7/3/23)  EFW: 3487gms (ATU scan 8/1/23)   Category I   Yemi every 3-5mins ICU Vital Signs Last 24 Hrs  T(C): 37.0 (09 Aug 2023 03:06), Max: 37 (09 Aug 2023 02:55)  T(F): 98.6 (09 Aug 2023 03:06), Max: 98.6 (09 Aug 2023 02:55)  HR: 75 (09 Aug 2023 03:05) (75 - 75)  BP: 128/87 (09 Aug 2023 03:05) (128/87 - 128/87)  BP(mean): --  ABP: --  ABP(mean): --  RR: 18 (09 Aug 2023 02:55) (18 - 18)  SpO2: --    O2 Parameters below as of 09 Aug 2023 02:55  Patient On (Oxygen Delivery Method): room air    Abdomen soft nontender  Speculum: Positive pooling, nitrazine, and fern   SVE: 1.5/60/-3  TAS: Cephalic presentation   Image saved to ASOB   GBS: Neg. (7/3/23)  EFW: 3487gms ([facility] scan 8/1/23)   Reactive NST  Yemi every 3-5mins

## 2023-08-09 NOTE — OB PROVIDER LABOR PROGRESS NOTE - NS_OBIHIFHRDETAILS_OBGYN_ALL_OB_FT
130/min variability/+accels/no decels    cat 2 tracing
130/mod/+accel/-decel, but discontinuous due to patient moving 2/2 discomfort
Baseline: 130 bpm, moderate variability,  + accels, - decels
Baseline: 135 bpm, moderate variability,  + accels, - decels

## 2023-08-09 NOTE — OB PROVIDER LABOR PROGRESS NOTE - NS_SUBJECTIVE/OBJECTIVE_OBGYN_ALL_OB_FT
pt seen and examined for placement of cervical balloon
Patient seen and evaluated at the bedside for complaints of urge to push.     Vital Signs Last 24 Hrs  T(C): 36.7 (09 Aug 2023 21:00), Max: 37 (09 Aug 2023 02:55)  T(F): 98.06 (09 Aug 2023 21:00), Max: 98.6 (09 Aug 2023 02:55)  HR: 73 (09 Aug 2023 23:20) (57 - 104)  BP: 135/85 (09 Aug 2023 23:18) (101/63 - 151/89)  BP(mean): --  RR: 19 (09 Aug 2023 21:00) (16 - 20)  SpO2: 94% (09 Aug 2023 23:20) (87% - 100%)    Parameters below as of 09 Aug 2023 05:29  Patient On (Oxygen Delivery Method): room air
S:  Pt seen and examined for increased pressure    O:  Vital Signs Last 24 Hrs  T(C): 36.7 (09 Aug 2023 21:00), Max: 37 (09 Aug 2023 02:55)  T(F): 98.06 (09 Aug 2023 21:00), Max: 98.6 (09 Aug 2023 02:55)  HR: 81 (09 Aug 2023 23:25) (57 - 104)  BP: 135/85 (09 Aug 2023 23:18) (101/63 - 151/89)  BP(mean): --  RR: 19 (09 Aug 2023 21:00) (16 - 20)  SpO2: 68% (09 Aug 2023 23:25) (68% - 100%)    Parameters below as of 09 Aug 2023 05:29  Patient On (Oxygen Delivery Method): room air
oS:  Pt seen and examined for increased pressure and pain.    O:  Vital Signs Last 24 Hrs  T(C): 36.6 (09 Aug 2023 17:10), Max: 37 (09 Aug 2023 02:55)  T(F): 97.88 (09 Aug 2023 17:10), Max: 98.6 (09 Aug 2023 02:55)  HR: 77 (09 Aug 2023 20:27) (57 - 104)  BP: 125/88 (09 Aug 2023 20:08) (101/63 - 138/83)  BP(mean): --  RR: 20 (09 Aug 2023 17:10) (16 - 20)  SpO2: 98% (09 Aug 2023 20:27) (90% - 100%)    Parameters below as of 09 Aug 2023 05:29  Patient On (Oxygen Delivery Method): room air

## 2023-08-09 NOTE — OB PROVIDER H&P - HISTORY OF PRESENT ILLNESS
Pt. is a 34y/o  EGA 41.3wks reports of contractions since midnight and leakage of fluid around 1am. Pt. denies vaginal bleeding. Pt. reports good fetal movement. Pt. desires to TOLAC.     AP: Denies  Surgical Hx: Spinal fusion (T5-L3)  (Did not have anesthesia consult)   Primary   2022 6#1 c/w sPEC/Cat II (5cm dilated)

## 2023-08-09 NOTE — CHART NOTE - NSCHARTNOTEFT_GEN_A_CORE
Pt seen at bedside.  RADHAAC consents signed.   As per discussion overnight - pt aware that if she requires a CS she will need a spinal or general anesthesia in the event of an emergency.  Pt currently requesting pain medication.  Anesthesia aware - will write orders for PCA.  Pt lex - will reexamine if ctx space and start pitocin as needed.    Dr Sands at bedside  BURTON Sykes-Bonner PGY3

## 2023-08-09 NOTE — OB PROVIDER H&P - NSLOWPPHRISK_OBGYN_A_OB
Montiel Pregnancy/Less than or equal to 4 previous vaginal births/No known bleeding disorder/No history of postpartum hemorrhage/No other PPH risks indicated

## 2023-08-10 RX ORDER — ACETAMINOPHEN 500 MG
3 TABLET ORAL
Qty: 0 | Refills: 0 | DISCHARGE

## 2023-08-10 RX ORDER — IBUPROFEN 200 MG
1 TABLET ORAL
Qty: 0 | Refills: 0 | DISCHARGE

## 2023-08-10 RX ORDER — IBUPROFEN 200 MG
600 TABLET ORAL EVERY 6 HOURS
Refills: 0 | Status: DISCONTINUED | OUTPATIENT
Start: 2023-08-10 | End: 2023-08-11

## 2023-08-10 RX ADMIN — Medication 975 MILLIGRAM(S): at 09:28

## 2023-08-10 RX ADMIN — Medication 975 MILLIGRAM(S): at 22:00

## 2023-08-10 RX ADMIN — Medication 30 MILLIGRAM(S): at 01:22

## 2023-08-10 RX ADMIN — Medication 1 TABLET(S): at 11:43

## 2023-08-10 RX ADMIN — Medication 975 MILLIGRAM(S): at 10:00

## 2023-08-10 RX ADMIN — Medication 975 MILLIGRAM(S): at 15:40

## 2023-08-10 RX ADMIN — SIMETHICONE 80 MILLIGRAM(S): 80 TABLET, CHEWABLE ORAL at 21:07

## 2023-08-10 RX ADMIN — Medication 975 MILLIGRAM(S): at 03:46

## 2023-08-10 RX ADMIN — Medication 600 MILLIGRAM(S): at 12:13

## 2023-08-10 RX ADMIN — Medication 975 MILLIGRAM(S): at 15:08

## 2023-08-10 RX ADMIN — SODIUM CHLORIDE 3 MILLILITER(S): 9 INJECTION INTRAMUSCULAR; INTRAVENOUS; SUBCUTANEOUS at 06:00

## 2023-08-10 RX ADMIN — SODIUM CHLORIDE 3 MILLILITER(S): 9 INJECTION INTRAMUSCULAR; INTRAVENOUS; SUBCUTANEOUS at 22:00

## 2023-08-10 RX ADMIN — Medication 600 MILLIGRAM(S): at 18:24

## 2023-08-10 RX ADMIN — Medication 975 MILLIGRAM(S): at 04:46

## 2023-08-10 RX ADMIN — Medication 600 MILLIGRAM(S): at 18:46

## 2023-08-10 RX ADMIN — Medication 975 MILLIGRAM(S): at 21:07

## 2023-08-10 RX ADMIN — Medication 600 MILLIGRAM(S): at 11:43

## 2023-08-10 RX ADMIN — Medication 1000 MILLIGRAM(S): at 01:52

## 2023-08-10 NOTE — OB RN DELIVERY SUMMARY - NSSELHIDDEN_OBGYN_ALL_OB_FT
[NS_DeliveryAttending1_OBGYN_ALL_OB_FT:[email or web address]=],[NS_DeliveryAssist1_OBGYN_ALL_OB_FT:[id number]=],[NS_DeliveryRN_OBGYN_ALL_OB_FT:[id number]=]

## 2023-08-10 NOTE — OB RN DELIVERY SUMMARY - NS_SEPSISRSKCALC_OBGYN_ALL_OB_FT
EOS calculated successfully. EOS Risk Factor: 0.5/1000 live births ([facility] national incidence); GA=41w3d; Temp=98.6; ROM=22.817; GBS='Negative'; Antibiotics='No antibiotics or any antibiotics < 2 hrs prior to birth'

## 2023-08-10 NOTE — DISCHARGE NOTE OB - PATIENT PORTAL LINK FT
You can access the FollowMyHealth Patient Portal offered by [facility] by registering at the following website: [email or web address]. By joining [facility]’s FollowMyHealth portal, you will also be able to view your health information using other applications (apps) compatible with our system.

## 2023-08-10 NOTE — DISCHARGE NOTE OB - CARE PROVIDER_API CALL
Medhat Constantino.  Obstetrics and Gynecology  [address] [place] [address]  Phone: [phone]  Fax: [phone]  Follow Up Time:

## 2023-08-10 NOTE — OB PROVIDER DELIVERY SUMMARY - NSPROVIDERDELIVERYNOTE_OBGYN_ALL_OB_FT
Patient was found to be fully dilated and directed to push with contractions.  Right mediolateral episiotomy was made to aid with delivery.  Spontaneous vaginal delivery of liveborn female infant.  Head, shoulders and body delivered easily.  Cord was delayed 1 minute. Cord was cut.  Infant was passed to mother.  Placenta delivered spontaneously intact. Uterine massage was performed and pitocin was given. Methergine was given for uterine atony.  Vaginal walls, sulci, and cervix examined and noted to be intact.  Fundus was firm. Second degree laceration repaired with chromic.  Good hemostasis was noted. Clots noted in lower uterine segment after repair completed, evacuated and given rectal cytotec 1000 mcg given prophylactically.  Count correct x2.

## 2023-08-10 NOTE — CHART NOTE - NSCHARTNOTEFT_GEN_A_CORE
Called by RN to see patient regarding c/o left arm weakness.   Met patient at bedside awake, alert and oriented x 4 in no distress. Patient is s/p  2023. Delivery complicated only by 2nd degree perineal laceration. Patient had full mobility of right arm and  no sensory deficit was noted.  strength was equal bilaterally. No edema of extremity noted. Peripheral IV in right antecubital fossa WNL. Warm compresses were given to patient because she started complaining of soreness and she was advised to notify staff if weakness/soreness does not improve or resolve. Lower extremities unremarkable. Called by RN to see patient regarding c/o left arm weakness.   Met patient at bedside awake, alert and oriented x 4 in no distress. Patient is s/p  2023. Delivery complicated only by 2nd degree perineal laceration. Vital Signs Last 24 Hrs  T(C): 36.3 (10 Aug 2023 14:00), Max: 37.1 (10 Aug 2023 09:00)  T(F): 97.4 (10 Aug 2023 14:00), Max: 98.7 (10 Aug 2023 09:00)  HR: 73 (10 Aug 2023 14:) (57 - 97)  BP: 110/69 (10 Aug 2023 14:00) (108/61 - 172/96)  BP(mean): --  RR: 18 (10 Aug 2023 14:00) (17 - 20)  SpO2: 98% (10 Aug 2023 14:00) (68% - 100%)    Parameters above as of 10 Aug 2023 14:00  Patient On (Oxygen Delivery Method): room air    Patient had full mobility of right arm and  no sensory deficit was noted.  strength was equal bilaterally. No edema of extremity noted. Peripheral IV in right antecubital fossa WNL. Warm compresses were given to patient because she started complaining of soreness and she was advised to notify staff if weakness/soreness does not improve or resolve. Lower extremities unremarkable.    D/W Dr Dougie Sifuentes-WHNP-BC.

## 2023-08-10 NOTE — PROGRESS NOTE ADULT - SUBJECTIVE AND OBJECTIVE BOX
S: 32yo  PPD#1 s/p . Patient feels well. No overnight event. Pain is well controlled. She is tolerating a regular diet and passing flatus. She is voiding spontaneously, and ambulating without difficulty. Denies CP/SOB. Denies lightheadedness/dizziness. Denies N/V.    O:  Vitals:  Vital Signs Last 24 Hrs  T(C): 37.1 (10 Aug 2023 09:00), Max: 37.1 (10 Aug 2023 09:00)  T(F): 98.7 (10 Aug 2023 09:00), Max: 98.7 (10 Aug 2023 09:00)  HR: 86 (10 Aug 2023 05:57) (57 - 104)  BP: 120/59 (10 Aug 2023 05:57) (101/63 - 172/96)  BP(mean): --  RR: 18 (10 Aug 2023 09:00) (17 - 20)  SpO2: 99% (10 Aug 2023 09:00) (68% - 100%)    Parameters below as of 10 Aug 2023 09:00  Patient On (Oxygen Delivery Method): room air      MEDICATIONS  (STANDING):  acetaminophen     Tablet .. 975 milliGRAM(s) Oral <User Schedule>  diphtheria/tetanus/pertussis (acellular) Vaccine (Adacel) 0.5 milliLiter(s) IntraMuscular once  ibuprofen  Tablet. 600 milliGRAM(s) Oral every 6 hours  oxytocin Infusion 333.333 milliUNIT(s)/Min (1000 mL/Hr) IV Continuous <Continuous>  prenatal multivitamin 1 Tablet(s) Oral daily  sodium chloride 0.9% lock flush 3 milliLiter(s) IV Push every 8 hours      Labs:  Blood type: O Positive  Rubella IgG: RPR: Negative                          13.2   14.29<H> >-----------< 223    (  @ 05:15 )             38.5    23 @ 05:15      137  |  102  |  10  ----------------------------<  89  4.0   |  23  |  0.57        Ca    9.3      09 Aug 2023 05:15    TPro  7.0  /  Alb  3.7  /  TBili  <0.2  /  DBili  x   /  AST  19  /  ALT  11  /  AlkPhos  166<H>  23 @ 05:15      Physical Exam:  General: NAD  Abdomen: soft, non-tender, non-distended, fundus firm  Vaginal: Lochia wnl  Extremities: No erythema/edema    A/P: 32yo  PPD#1 s/p .  Patient is stable and doing well post-partum.  #Post-Partum   - Pain well controlled, continue current pain regimen  - Increase ambulation, SCDs when not ambulating  - Continue regular diet  -Discharge planning-for discharge tomorrow    KIRSTY Ramirez

## 2023-08-10 NOTE — OB PROVIDER DELIVERY SUMMARY - NSSELHIDDEN_OBGYN_ALL_OB_FT
[NS_DeliveryAttending1_OBGYN_ALL_OB_FT:[id number]=],[NS_DeliveryAssist1_OBGYN_ALL_OB_FT:[id number]=]

## 2023-08-10 NOTE — OB NEONATOLOGY/PEDIATRICIAN DELIVERY SUMMARY - NSPEDSNEONOTESA_OBGYN_ALL_OB_FT
41+3wk female born via  to a 30 y/o  blood type O+ mother. Maternal history of pre-eclampsia in prior pregnancy, on magnesium supplementation. Family history of malignant hyperthermia. PNL -/-/NR/I, GBS unknown. SROM at 01:00 on  with clear fluids. Highest maternal temperature 36.8. Baby emerged vigorous, crying. Cord clamping delayed 60sec.  Infant was brought to radiant warmer and warmed, dried, stimulated and suctioned. HR>100, normal respiratory effort. with APGARS of 8/8. O2 sats remained in the 80s so CPAP was started around 9 minutes of life, max settings 5/21%, for 3 minutes, with improvement in O2 sats.  Mom plans to initiate breastfeeding as well as formula feed, consents Hep B vaccine.  EOS ___.    BW:   : 23  TOB: 41+3wk female born via  to a 30 y/o  blood type O+ mother. Maternal history of pre-eclampsia in prior pregnancy, on magnesium supplementation. Family history of malignant hyperthermia. PNL -/-/NR/I, GBS unknown. SROM at 01:00 on  with clear fluids. Highest maternal temperature 36.8. Peds was called to the delivery for category II tracing. Baby emerged vigorous, crying. Cord clamping delayed 60sec.  Infant was brought to radiant warmer and warmed, dried, stimulated and suctioned. HR>100, normal respiratory effort. with APGARS of 8/8. O2 sats remained in the 80s so CPAP was started around 9 minutes of life, max settings 5/21%, for 3 minutes, with improvement in O2 sats.  Mom plans to initiate breastfeeding as well as formula feed, consents Hep B vaccine.  EOS 0.2.    BW:   : 23  TOB: 23:49 41+3wk female born via  to a 32 y/o  blood type O+ mother. Maternal history of pre-eclampsia in prior pregnancy, on magnesium supplementation. Family history of malignant hyperthermia. PNL -/-/NR/I, GBS unknown. SROM at 01:00 on  with clear fluids. Highest maternal temperature 36.8. Peds was called to the delivery for category II tracing. Baby emerged vigorous, crying. Cord clamping delayed 60sec.  Infant was brought to radiant warmer and warmed, dried, stimulated and suctioned. HR>100, normal respiratory effort. with APGARS of 8/8. O2 sats remained in the 80s so CPAP was started around 9 minutes of life, max settings 5/21%, for 3 minutes, with improvement in O2 sats.  Mom plans to initiate breastfeeding as well as formula feed, consents Hep B vaccine.  EOS 0.2.    BW:   : 23  TOB: 23:49    Physical Exam:  Gen: NAD, +grimace  HEENT: anterior fontanel open soft and flat, no cleft lip/palate, ears normal set, no ear pits or tags. no lesions in mouth/throat, nares clinically patent  Resp: no increased work of breathing, good air entry b/l, clear to auscultation bilaterally  Cardio: Normal S1/S2, regular rate and rhythm, no murmurs, rubs or gallops  Abd: soft, non tender, non distended, + bowel sounds, umbilical cord with 3 vessels  Neuro: +grasp/suck/harshad, normal tone  Extremities: negative fernández and ortolani, moving all extremities, full range of motion x 4, no crepitus  Skin: pink, warm  Genitals: Normal female anatomy, Valentin 1, anus patent 41+3wk female born via  to a 32 y/o  blood type O+ mother. Maternal history of pre-eclampsia in prior pregnancy, on magnesium supplementation. Family history of malignant hyperthermia. PNL -/-/NR/I, GBS unknown. SROM at 01:00 on  with clear fluids. Highest maternal temperature 36.8. Peds was called to the delivery for category II tracing. Baby emerged vigorous, crying. Cord clamping delayed 60sec.  Infant was brought to radiant warmer and warmed, dried, stimulated and suctioned. HR>100, normal respiratory effort. with APGARS of 8/8. O2 sats remained in the 80s so CPAP was started around 9 minutes of life, max settings 5/21%, for 3 minutes, with improvement in O2 sats.  Mom plans to initiate breastfeeding as well as formula feed, consents Hep B vaccine.  EOS 0.2.    BW: 3820 g  : 23  TOB: 23:49    Physical Exam:  Gen: NAD, +grimace  HEENT: anterior fontanel open soft and flat, no cleft lip/palate, ears normal set, no ear pits or tags. no lesions in mouth/throat, nares clinically patent  Resp: no increased work of breathing, good air entry b/l, clear to auscultation bilaterally  Cardio: Normal S1/S2, regular rate and rhythm, no murmurs, rubs or gallops  Abd: soft, non tender, non distended, + bowel sounds, umbilical cord with 3 vessels  Neuro: +grasp/suck/harshad, normal tone  Extremities: negative fernández and ortolani, moving all extremities, full range of motion x 4, no crepitus  Skin: pink, warm  Genitals: Normal female anatomy, Valentin 1, anus patent

## 2023-08-11 VITALS
SYSTOLIC BLOOD PRESSURE: 131 MMHG | RESPIRATION RATE: 18 BRPM | OXYGEN SATURATION: 99 % | HEART RATE: 63 BPM | DIASTOLIC BLOOD PRESSURE: 88 MMHG | TEMPERATURE: 98 F

## 2023-08-11 RX ADMIN — Medication 600 MILLIGRAM(S): at 00:39

## 2023-08-11 RX ADMIN — Medication 975 MILLIGRAM(S): at 03:31

## 2023-08-11 RX ADMIN — Medication 975 MILLIGRAM(S): at 08:57

## 2023-08-11 RX ADMIN — Medication 975 MILLIGRAM(S): at 09:26

## 2023-08-11 RX ADMIN — Medication 600 MILLIGRAM(S): at 07:07

## 2023-08-11 RX ADMIN — Medication 975 MILLIGRAM(S): at 04:00

## 2023-08-11 RX ADMIN — Medication 600 MILLIGRAM(S): at 12:15

## 2023-08-11 RX ADMIN — SODIUM CHLORIDE 3 MILLILITER(S): 9 INJECTION INTRAMUSCULAR; INTRAVENOUS; SUBCUTANEOUS at 05:29

## 2023-08-11 RX ADMIN — Medication 1 TABLET(S): at 12:16

## 2023-08-11 RX ADMIN — Medication 600 MILLIGRAM(S): at 06:34

## 2023-08-11 RX ADMIN — Medication 600 MILLIGRAM(S): at 12:37

## 2023-08-11 RX ADMIN — Medication 600 MILLIGRAM(S): at 01:00

## 2023-08-11 NOTE — PROGRESS NOTE ADULT - SUBJECTIVE AND OBJECTIVE BOX
S: Patient doing well. Minimal lochia. Pain controlled.    O: Vital Signs Last 24 Hrs  T(C): 36.4 (11 Aug 2023 06:12), Max: 37.1 (10 Aug 2023 09:00)  T(F): 97.6 (11 Aug 2023 06:12), Max: 98.7 (10 Aug 2023 09:00)  HR: 74 (11 Aug 2023 06:12) (68 - 74)  BP: 110/63 (11 Aug 2023 06:12) (110/63 - 127/78)  BP(mean): --  RR: 17 (11 Aug 2023 06:12) (16 - 18)  SpO2: 99% (11 Aug 2023 06:12) (98% - 99%)    Parameters below as of 11 Aug 2023 06:12  Patient On (Oxygen Delivery Method): room air        Gen: NAD  Abd: soft, NT, ND, fundus firm below umbilicus  Lochia: moderate  Ext: no tenderness    Labs:      A: 33y PPD# s/p  doing well.  Plan: Routine care  Dc with instructions

## 2023-08-14 ENCOUNTER — NON-APPOINTMENT (OUTPATIENT)
Age: 33
End: 2023-08-14

## 2023-08-14 RX ORDER — NIFEDIPINE 30 MG/1
30 TABLET, FILM COATED, EXTENDED RELEASE ORAL
Qty: 30 | Refills: 1 | Status: DISCONTINUED | COMMUNITY
End: 2023-08-14

## 2024-06-13 NOTE — OB RN PATIENT PROFILE - BRAND OF COVID-19 VACCINATION
"Behavioral Health Psychotherapy Progress Note    Psychotherapy Provided: Individual Psychotherapy     Encounter Diagnoses   Name Primary?   • INES (generalized anxiety disorder) Yes             Goals addressed in session: Goal 1     DATA:  Nury spoke today about her feelings re: having received approval for a second opinion to address ob/gyn issues.  She shared the list of data, questions she developed for this appt with this worker.  Nury also reported continued conflict with her sibling who is home for the summer.   During this session, this clinician used the following therapeutic modalities: Motivational Interviewing and Supportive Psychotherapy    Substance Abuse was not addressed during this session. If the client is diagnosed with a co-occurring substance use disorder, please indicate any changes in the frequency or amount of use: . Stage of change for addressing substance use diagnoses: No substance use/Not applicable    ASSESSMENT:  Nury Hernandez presents with a Euthymic/ normal mood.  Nury did not mention the purchase of her future home today.  She has continued to work at managing her anxiety as she recognizes the impact that it has on her health.     her affect is Normal range and intensity, which is congruent, with her mood and the content of the session. The client has made progress on their goals.     Nury Hernandez presents with a minimal risk of suicide, minimal risk of self-harm, and minimal risk of harm to others.    For any risk assessment that surpasses a \"low\" rating, a safety plan must be developed.    A safety plan was indicated: no  If yes, describe in detail     PLAN: Between sessions, Nury Hernandez will continue to utilize therapy sessions to increase her mindfulness and self compassion. . At the next session, the therapist will use Supportive Psychotherapy to address the above in further detail.    Behavioral Health Treatment Plan and Discharge Planning: Nury MCCLELLAN" David is aware of and agrees to continue to work on their treatment plan. They have identified and are working toward their discharge goals. yes        6/12/24  Start Time: 1300  Stop Time: 1355  Total Visit Time: 55 minutes     Moderna dose 1 and 2

## 2024-10-03 NOTE — OB RN PATIENT PROFILE - FUNCTIONAL ASSESSMENT - DAILY ACTIVITY 5.
Patient Name: Chema Soto [facility]   YOB: 1960 [address] [place] [address]   MRN: [id number]    [place] [address]       Date of Service: 10/3/2024    Subjective       Chief Complaint   Patient presents with    Follow-up    Other     Pt declined flu, covid and pneumo     Refill Request     90 day supply        Followup visit. Doing well overall. Had episode of cellulitis recently, getting better after completing abx. No hypoglycemia. Due for labs.     Patient's medications, allergies, past medical, surgical, social and family histories were reviewed and updated as appropriate.    Review of Systems   Constitutional:  Negative for chills, fever and unexpected weight change.   HENT:  Negative for ear pain and sore throat.    Eyes:  Negative for pain and visual disturbance.   Respiratory:  Negative for cough and shortness of breath.    Cardiovascular:  Negative for chest pain and palpitations.   Gastrointestinal:  Negative for abdominal pain.        No change in bowel habits   Genitourinary:  Negative for difficulty urinating.   Skin:  Negative for rash.       Objective     Vitals:    10/03/24 1553   BP: 100/68   Pulse: 74   Resp: 18   Temp: 96.6 °F (35.9 °C)       Physical Exam  Vitals and nursing note reviewed.   Constitutional:       General: He is not in acute distress.     Appearance: He is well-developed.   HENT:      Head: Normocephalic.      Neck: Normal range of motion.   Eyes:      General: No scleral icterus.     Conjunctiva/sclera: Conjunctivae normal.      Pupils: Pupils are equal, round, and reactive to light.   Neck:      Thyroid: No thyromegaly.   Cardiovascular:      Rate and Rhythm: Normal rate and regular rhythm.      Heart sounds: Normal heart sounds.   Pulmonary:      Effort: Pulmonary effort is normal.      Breath sounds: Normal breath sounds.   Skin:     General: Skin is warm and dry.      Findings: No rash.          Assessment and Plan       Problem List Items Addressed This Visit    None  Visit Diagnoses       Type 2 diabetes mellitus with hyperglycemia, without long-term current use of insulin (CMD)    -  Primary    Relevant Orders    SERVICE TO OPHTHALMOLOGY    Comprehensive Metabolic Panel    Glycohemoglobin    CBC with Automated Differential            Follow-up: karthik Cobb DO  10/3/2024     4 = No assist / stand by assistance

## 2024-12-03 ENCOUNTER — NON-APPOINTMENT (OUTPATIENT)
Age: 34
End: 2024-12-03

## 2024-12-03 DIAGNOSIS — Z80.0 FAMILY HISTORY OF MALIGNANT NEOPLASM OF DIGESTIVE ORGANS: ICD-10-CM

## 2024-12-03 DIAGNOSIS — Z80.3 FAMILY HISTORY OF MALIGNANT NEOPLASM OF BREAST: ICD-10-CM

## 2024-12-03 DIAGNOSIS — Z83.3 FAMILY HISTORY OF DIABETES MELLITUS: ICD-10-CM

## 2024-12-03 DIAGNOSIS — Z78.9 OTHER SPECIFIED HEALTH STATUS: ICD-10-CM

## 2024-12-03 DIAGNOSIS — Z98.891 HISTORY OF UTERINE SCAR FROM PREVIOUS SURGERY: ICD-10-CM

## 2024-12-03 DIAGNOSIS — Z98.890 OTHER SPECIFIED POSTPROCEDURAL STATES: ICD-10-CM

## 2024-12-03 DIAGNOSIS — Z82.3 FAMILY HISTORY OF STROKE: ICD-10-CM

## 2024-12-03 DIAGNOSIS — Z80.41 FAMILY HISTORY OF MALIGNANT NEOPLASM OF OVARY: ICD-10-CM

## 2024-12-03 DIAGNOSIS — Z82.49 FAMILY HISTORY OF ISCHEMIC HEART DISEASE AND OTHER DISEASES OF THE CIRCULATORY SYSTEM: ICD-10-CM

## 2024-12-03 NOTE — OB RN PATIENT PROFILE - FUNCTIONAL ASSESSMENT - BASIC MOBILITY PT AGE POP HIDDEN
"Date of Office Visit: 2024  Encounter Provider: Keon Mann MD  Place of Service: [facility] CARDIOLOGY  Patient Name: Debbie Cheng  :1947    Chief Complaint   Patient presents with    Follow-up   :     HPI: Debbie Cheng is a 77 y.o. female who presents today in follow-up. I have reviewed prior notes and there are no changes except for any new updates described below. I have also reviewed any information entered into the medical record by the patient or by ancillary staff.     She is an extremely pleasant lady who had one episode of highly symptomatic atrial fibrillation in May 2015. She converted on her own. She was started on rivaroxaban and metoprolol. In 2015, I stopped the NOAC when it was clear that she had experienced no recurrence.     In 2021, she reported palpitations that sounded like premature ectopics; a Holter showed a 4% burden of of PACs and a 4% burden of PVCs. An echo showed normal LV size and systolic function, mild-moderate LAE, and mild-moderate MR.     In , she reported an increased frequency of palpitations; they were not consistent with her previous PAF. She felt skips and a pause. She went to the ED, and was noted to have PVCs. She was reassured; her symptoms have improved. She was told that her \"heart was enlarged.\" I reviewed her CXR and did not appreciate significant cardiomegalky.     She has not chest pain or dyspnea. She has no pedal edema or orthopnea. She has had no syncope. Her palpitations are stable and mild.     Past Medical History:   Diagnosis Date    Acute bacterial conjunctivitis of left eye 10/20/2016    Allergic rhinitis     Asthma     Basal cell carcinoma of skin     Bronchitis     Colon polyp     GERD (gastroesophageal reflux disease)     HL (hearing loss)     Hyperlipidemia     Hypertension     PAF (paroxysmal atrial fibrillation)     one episode, 2015; was briefly treated with Xarelto    Pneumonia  "    Premature atrial complexes 2022    PVCs (premature ventricular contractions) 2022    Rotator cuff tendonitis     Squamous cell skin cancer     Type 2 diabetes mellitus     UTI (urinary tract infection)     Vitamin D deficiency        Past Surgical History:   Procedure Laterality Date    BRONCHOSCOPY N/A 3/8/2024    Procedure: BRONCHOSCOPY WITH ENDOBRONCHIAL ULTRASOUND with FNA's, BAL;  Surgeon: Mauro Galloway MD;  Location: [facility] ENDOSCOPY;  Service: Pulmonary;  Laterality: N/A;  hilar lung mass    CARDIAC CATHETERIZATION      Cardiologist takes care of     SECTION      CHOLECYSTECTOMY      COLONOSCOPY      COLONOSCOPY N/A 2024    Procedure: COLONOSCOPY to Cecum with Polypectomy;  Surgeon: Wisam Mccarthy MD;  Location: [facility] [place] MAIN OR;  Service: Gastroenterology;  Laterality: N/A;  Diverticulosis, Descending Colon Polyp    EYE SURGERY Bilateral     cataract surgery    GUM SURGERY  2017    HYSTERECTOMY      KNEE SURGERY         Social History     Socioeconomic History    Marital status:    Tobacco Use    Smoking status: Never     Passive exposure: Never    Smokeless tobacco: Never    Tobacco comments:     Father was a geavy smoker   Vaping Use    Vaping status: Never Used   Substance and Sexual Activity    Alcohol use: No    Drug use: No    Sexual activity: Yes     Partners: Male       Family History   Problem Relation Age of Onset    Stroke Mother     Hypertension Mother     Heart disease Father     Heart attack Father     Alcohol abuse Father         Ptsd    Hypertension Sister     Colon polyps Sister     Heart disease Brother     Cancer Paternal Grandmother             Breast cancer Paternal Grandmother     Heart disease Paternal Grandfather     Diabetes Paternal Grandfather     Kidney disease Sister         Jacks2       Review of Systems   Constitutional: Negative for malaise/fatigue.   Cardiovascular:  Positive for palpitations. Negative for chest  pain.   Respiratory:  Negative for shortness of breath.    Musculoskeletal:  Positive for joint pain.   Neurological:  Negative for light-headedness.   All other systems reviewed and are negative.      Allergies   Allergen Reactions    Penicillins Anaphylaxis and Swelling    Erythromycin Other (See Comments)     JOINT PAIN AND SWELLING    Astelin [Azelastine] Cough         Current Outpatient Medications:     fluticasone (FLONASE) 50 MCG/ACT nasal spray, Administer 2 sprays into the nostril(s) as directed by provider Daily. Administer 2 sprays in each nostril for each dose., Disp: , Rfl:     glucose blood (OneTouch Ultra) test strip, USE TO CHECK BLOOD SUGAR DAILY. USE AS INSTRUCTED.(E11.65,KS), Disp: 100 each, Rfl: 3    glucose monitor monitoring kit, Use glucose meter to check fasting glucose once daily for diabetes monitoring. E11.9, Disp: 1 each, Rfl: 0    levocetirizine (XYZAL) 5 MG tablet, Take 1 tablet by mouth Every Morning., Disp: , Rfl:     magnesium oxide (MAGOX) 400 (241.3 Mg) MG tablet tablet, Take 250 mg by mouth Every Other Day., Disp: , Rfl:     metoprolol tartrate (LOPRESSOR) 25 MG tablet, TAKE 1 TABLET BY MOUTH TWICE A DAY, Disp: 180 tablet, Rfl: 1    montelukast (SINGULAIR) 10 MG tablet, TAKE 1 TABLET BY MOUTH EVERY DAY AT NIGHT, Disp: 90 tablet, Rfl: 3    multivitamin with minerals tablet tablet, Take 1 tablet by mouth Daily., Disp: , Rfl:     OneTouch Delica Lancets 33G misc, USE ONE LANCET DAILY TO CHECK FASTING GLUCOSE FOR DIABETES MONITORING. E11.9, Disp: 100 each, Rfl: 12    rivaroxaban (Xarelto) 20 MG tablet, Take 1 tablet by mouth Daily With Dinner., Disp: , Rfl:     rosuvastatin (CRESTOR) 20 MG tablet, TAKE 1 TABLET BY MOUTH EVERY DAY (STOP SIMVASTATIN), Disp: 90 tablet, Rfl: 1    valsartan-hydrochlorothiazide (DIOVAN-HCT) 320-12.5 MG per tablet, Take 1 tablet by mouth Daily., Disp: 90 tablet, Rfl: 1    VENTOLIN  (90 Base) MCG/ACT inhaler, 2 puffs Every 4 (Four) Hours As Needed for  "Shortness of Air or Wheezing., Disp: , Rfl: 3    vitamin D (ERGOCALCIFEROL) 1.25 MG (31422 UT) capsule capsule, TAKE 1 CAPSULE BY MOUTH ONE TIME PER WEEK, Disp: 12 capsule, Rfl: 3     Objective:     Vitals:    12/03/24 1006   BP: 130/80   BP Location: Left arm   Patient Position: Sitting   Cuff Size: Adult   Pulse: 58   SpO2: 95%   Weight: 71.2 kg (157 lb)   Height: 154.9 cm (61\")       Body mass index is 29.66 kg/m².    Physical Exam  Vitals reviewed.   Constitutional:       Appearance: She is well-developed.   HENT:      Head: Normocephalic.      Nose: Nose normal.      Mouth/Throat:      Pharynx: Oropharynx is clear.   Eyes:      Conjunctiva/sclera: Conjunctivae normal.   Neck:      Vascular: No JVD.   Cardiovascular:      Rate and Rhythm: Normal rate and regular rhythm.      Pulses: Normal pulses and intact distal pulses.      Heart sounds: Normal heart sounds. No murmur heard.  Pulmonary:      Effort: Pulmonary effort is normal.      Breath sounds: Normal breath sounds.   Abdominal:      Palpations: Abdomen is soft.      Tenderness: There is no abdominal tenderness.   Musculoskeletal:         General: No swelling. Normal range of motion.      Cervical back: Normal range of motion.   Lymphadenopathy:      Cervical: No cervical adenopathy.   Skin:     General: Skin is warm and dry.      Findings: No rash.   Neurological:      General: No focal deficit present.      Mental Status: She is alert and oriented to person, place, and time.      Cranial Nerves: No cranial nerve deficit.   Psychiatric:         Mood and Affect: Mood normal.         Behavior: Behavior normal.         Thought Content: Thought content normal.         Procedures      Assessment:       Diagnosis Plan   1. Paroxysmal atrial fibrillation        2. Premature atrial complexes        3. PVCs (premature ventricular contractions)        4. Essential hypertension             Plan:       1.  She had one highly symptomatic AF episode and has had no " evidence of recurrence.  She will remain on metoprolol.  As her left atrium is dilated and her CHADSVASC is 3, I have recommended she remain on AC.     2/3.  A monitor in 2021 showed a 4% burden (H) of premature atrial and premature ventricular contractions.  Her palpitations are consistent with PVCs.  She can definitely tell the difference between these and atrial fibrillation.  If these were to happen again, she could take an extra dose of metoprolol or magnesium, and I encouraged her to get up and move around, as increasing her heart rate will often make them go away.  We talked about the generally benign nature of these.    4. Her BP is well controlled.     Sincerely,       Keon Mann MD                 Adult

## 2024-12-04 ENCOUNTER — APPOINTMENT (OUTPATIENT)
Facility: CLINIC | Age: 34
End: 2024-12-04

## 2024-12-04 ENCOUNTER — LABORATORY RESULT (OUTPATIENT)
Age: 34
End: 2024-12-04

## 2024-12-04 ENCOUNTER — ASOB RESULT (OUTPATIENT)
Age: 34
End: 2024-12-04

## 2024-12-04 VITALS
WEIGHT: 133 LBS | BODY MASS INDEX: 24.48 KG/M2 | DIASTOLIC BLOOD PRESSURE: 80 MMHG | HEIGHT: 62 IN | SYSTOLIC BLOOD PRESSURE: 117 MMHG

## 2024-12-04 DIAGNOSIS — N91.2 AMENORRHEA, UNSPECIFIED: ICD-10-CM

## 2024-12-04 DIAGNOSIS — N83.201 UNSPECIFIED OVARIAN CYST, RIGHT SIDE: ICD-10-CM

## 2024-12-04 DIAGNOSIS — Z00.00 ENCOUNTER FOR GENERAL ADULT MEDICAL EXAMINATION W/OUT ABNORMAL FINDINGS: ICD-10-CM

## 2024-12-04 PROCEDURE — 76817 TRANSVAGINAL US OBSTETRIC: CPT

## 2024-12-04 PROCEDURE — 99459 PELVIC EXAMINATION: CPT

## 2024-12-04 PROCEDURE — 76376 3D RENDER W/INTRP POSTPROCES: CPT

## 2024-12-04 PROCEDURE — 99395 PREV VISIT EST AGE 18-39: CPT

## 2024-12-04 PROCEDURE — 81025 URINE PREGNANCY TEST: CPT

## 2024-12-04 PROCEDURE — 99213 OFFICE O/P EST LOW 20 MIN: CPT | Mod: 25

## 2024-12-05 LAB — HCG UR QL: POSITIVE

## 2024-12-10 ENCOUNTER — APPOINTMENT (OUTPATIENT)
Facility: CLINIC | Age: 34
End: 2024-12-10

## 2024-12-31 ENCOUNTER — APPOINTMENT (OUTPATIENT)
Facility: CLINIC | Age: 34
End: 2024-12-31

## 2024-12-31 VITALS — BODY MASS INDEX: 24.33 KG/M2 | SYSTOLIC BLOOD PRESSURE: 111 MMHG | DIASTOLIC BLOOD PRESSURE: 69 MMHG | WEIGHT: 133 LBS

## 2024-12-31 PROCEDURE — 76816 OB US FOLLOW-UP PER FETUS: CPT

## 2024-12-31 PROCEDURE — 99214 OFFICE O/P EST MOD 30 MIN: CPT | Mod: 25

## 2024-12-31 PROCEDURE — 76819 FETAL BIOPHYS PROFIL W/O NST: CPT | Mod: 59

## 2024-12-31 PROCEDURE — 99459 PELVIC EXAMINATION: CPT

## 2025-01-08 ENCOUNTER — LABORATORY RESULT (OUTPATIENT)
Age: 35
End: 2025-01-08

## 2025-02-03 ENCOUNTER — NON-APPOINTMENT (OUTPATIENT)
Age: 35
End: 2025-02-03

## 2025-02-03 DIAGNOSIS — Z80.42 FAMILY HISTORY OF MALIGNANT NEOPLASM OF PROSTATE: ICD-10-CM

## 2025-02-04 ENCOUNTER — NON-APPOINTMENT (OUTPATIENT)
Age: 35
End: 2025-02-04

## 2025-02-04 ENCOUNTER — APPOINTMENT (OUTPATIENT)
Facility: CLINIC | Age: 35
End: 2025-02-04

## 2025-02-04 PROCEDURE — 76376 3D RENDER W/INTRP POSTPROCES: CPT

## 2025-02-04 PROCEDURE — 76817 TRANSVAGINAL US OBSTETRIC: CPT

## 2025-02-04 PROCEDURE — 0501F PRENATAL FLOW SHEET: CPT

## 2025-03-04 ENCOUNTER — NON-APPOINTMENT (OUTPATIENT)
Age: 35
End: 2025-03-04

## 2025-03-04 ENCOUNTER — APPOINTMENT (OUTPATIENT)
Facility: CLINIC | Age: 35
End: 2025-03-04
Payer: COMMERCIAL

## 2025-03-04 PROCEDURE — 0502F SUBSEQUENT PRENATAL CARE: CPT

## 2025-04-01 ENCOUNTER — ASOB RESULT (OUTPATIENT)
Age: 35
End: 2025-04-01

## 2025-04-01 ENCOUNTER — APPOINTMENT (OUTPATIENT)
Facility: CLINIC | Age: 35
End: 2025-04-01
Payer: COMMERCIAL

## 2025-04-01 ENCOUNTER — APPOINTMENT (OUTPATIENT)
Dept: ANTEPARTUM | Facility: CLINIC | Age: 35
End: 2025-04-01
Payer: COMMERCIAL

## 2025-04-01 PROCEDURE — 0502F SUBSEQUENT PRENATAL CARE: CPT

## 2025-04-01 PROCEDURE — 76805 OB US >/= 14 WKS SNGL FETUS: CPT

## 2025-04-29 ENCOUNTER — APPOINTMENT (OUTPATIENT)
Facility: CLINIC | Age: 35
End: 2025-04-29
Payer: COMMERCIAL

## 2025-04-29 PROCEDURE — 0502F SUBSEQUENT PRENATAL CARE: CPT

## 2025-05-23 ENCOUNTER — LABORATORY RESULT (OUTPATIENT)
Age: 35
End: 2025-05-23

## 2025-05-23 NOTE — OB RN DELIVERY SUMMARY - NSCORDSECONDSA_OBGYN_A_OB_FT
Quality 226: Preventive Care And Screening: Tobacco Use: Screening And Cessation Intervention: Patient screened for tobacco use and is an ex/non-smoker Quality 130: Documentation Of Current Medications In The Medical Record: Current Medications Documented Detail Level: Detailed Quality 431: Preventive Care And Screening: Unhealthy Alcohol Use - Screening: Patient not identified as an unhealthy alcohol user when screened for unhealthy alcohol use using a systematic screening method 60

## 2025-05-27 ENCOUNTER — NON-APPOINTMENT (OUTPATIENT)
Age: 35
End: 2025-05-27

## 2025-05-27 ENCOUNTER — APPOINTMENT (OUTPATIENT)
Facility: CLINIC | Age: 35
End: 2025-05-27
Payer: COMMERCIAL

## 2025-05-27 DIAGNOSIS — Z23 ENCOUNTER FOR IMMUNIZATION: ICD-10-CM

## 2025-05-27 PROCEDURE — 90471 IMMUNIZATION ADMIN: CPT

## 2025-05-27 PROCEDURE — 90715 TDAP VACCINE 7 YRS/> IM: CPT

## 2025-05-27 PROCEDURE — 0502F SUBSEQUENT PRENATAL CARE: CPT

## 2025-06-10 ENCOUNTER — APPOINTMENT (OUTPATIENT)
Facility: CLINIC | Age: 35
End: 2025-06-10
Payer: COMMERCIAL

## 2025-06-10 PROCEDURE — 0502F SUBSEQUENT PRENATAL CARE: CPT

## 2025-06-24 ENCOUNTER — APPOINTMENT (OUTPATIENT)
Facility: CLINIC | Age: 35
End: 2025-06-24

## 2025-06-24 PROCEDURE — 0502F SUBSEQUENT PRENATAL CARE: CPT

## 2025-06-24 PROCEDURE — 76819 FETAL BIOPHYS PROFIL W/O NST: CPT | Mod: 59

## 2025-06-24 PROCEDURE — 76816 OB US FOLLOW-UP PER FETUS: CPT

## 2025-07-03 ENCOUNTER — ASOB RESULT (OUTPATIENT)
Age: 35
End: 2025-07-03

## 2025-07-03 ENCOUNTER — APPOINTMENT (OUTPATIENT)
Dept: ANTEPARTUM | Facility: CLINIC | Age: 35
End: 2025-07-03

## 2025-07-03 PROCEDURE — 76816 OB US FOLLOW-UP PER FETUS: CPT

## 2025-07-03 PROCEDURE — 76819 FETAL BIOPHYS PROFIL W/O NST: CPT | Mod: 59

## 2025-07-08 ENCOUNTER — APPOINTMENT (OUTPATIENT)
Facility: CLINIC | Age: 35
End: 2025-07-08
Payer: COMMERCIAL

## 2025-07-08 PROCEDURE — 0502F SUBSEQUENT PRENATAL CARE: CPT

## 2025-07-12 PROBLEM — Z34.80 PRENATAL CARE, SUBSEQUENT PREGNANCY: Status: ACTIVE | Noted: 2025-07-12

## 2025-07-14 LAB
GP B STREP DNA SPEC QL NAA+PROBE: NOT DETECTED
SOURCE GBS: NORMAL

## 2025-07-15 ENCOUNTER — APPOINTMENT (OUTPATIENT)
Facility: CLINIC | Age: 35
End: 2025-07-15
Payer: COMMERCIAL

## 2025-07-15 PROCEDURE — 0502F SUBSEQUENT PRENATAL CARE: CPT

## 2025-07-16 NOTE — OB PROVIDER H&P - NSINFECTIONS_OBGYN_ALL_OB
Patient is scheduled for surgery with Dr. Jules Bundy     Spoke with: Bradly     Date of Surgery: 09/22/20     Location: [facility]:  [address] [place] [address]     Informed patient they will need an adult : Yes     H&P will be completed at: PAC 09/18/20     Post Op scheduled on 09/23, 09/30, and 10/26     Surgery packet was mailed 08/21     Additional comments: Advised RN will call 1 - 2 business days prior with arrival time and instructions.      
KED out reach to schedule patient with PCP. No ability to leave voice message. New phone number per [facility]- [phone].  
Unknown at this time

## 2025-07-22 ENCOUNTER — APPOINTMENT (OUTPATIENT)
Facility: CLINIC | Age: 35
End: 2025-07-22

## 2025-07-22 PROCEDURE — 0502F SUBSEQUENT PRENATAL CARE: CPT

## 2025-07-29 ENCOUNTER — APPOINTMENT (OUTPATIENT)
Facility: CLINIC | Age: 35
End: 2025-07-29
Payer: COMMERCIAL

## 2025-07-29 PROCEDURE — 0502F SUBSEQUENT PRENATAL CARE: CPT

## 2025-07-31 ENCOUNTER — ASOB RESULT (OUTPATIENT)
Age: 35
End: 2025-07-31

## 2025-07-31 ENCOUNTER — APPOINTMENT (OUTPATIENT)
Dept: ANTEPARTUM | Facility: CLINIC | Age: 35
End: 2025-07-31
Payer: COMMERCIAL

## 2025-07-31 PROCEDURE — 76816 OB US FOLLOW-UP PER FETUS: CPT

## 2025-08-05 ENCOUNTER — APPOINTMENT (OUTPATIENT)
Facility: CLINIC | Age: 35
End: 2025-08-05
Payer: COMMERCIAL

## 2025-08-05 PROCEDURE — 0502F SUBSEQUENT PRENATAL CARE: CPT

## 2025-08-11 ENCOUNTER — APPOINTMENT (OUTPATIENT)
Facility: CLINIC | Age: 35
End: 2025-08-11
Payer: COMMERCIAL

## 2025-08-11 PROCEDURE — 0502F SUBSEQUENT PRENATAL CARE: CPT

## 2025-08-13 ENCOUNTER — APPOINTMENT (OUTPATIENT)
Facility: CLINIC | Age: 35
End: 2025-08-13

## 2025-08-13 PROCEDURE — 0502F SUBSEQUENT PRENATAL CARE: CPT

## 2025-08-15 ENCOUNTER — ASOB RESULT (OUTPATIENT)
Age: 35
End: 2025-08-15

## 2025-08-15 ENCOUNTER — APPOINTMENT (OUTPATIENT)
Dept: ANTEPARTUM | Facility: CLINIC | Age: 35
End: 2025-08-15

## 2025-08-15 PROCEDURE — 76818 FETAL BIOPHYS PROFILE W/NST: CPT

## 2025-08-18 ENCOUNTER — APPOINTMENT (OUTPATIENT)
Facility: CLINIC | Age: 35
End: 2025-08-18
Payer: COMMERCIAL

## 2025-08-18 PROCEDURE — 0502F SUBSEQUENT PRENATAL CARE: CPT

## 2025-08-19 ENCOUNTER — APPOINTMENT (OUTPATIENT)
Dept: ANTEPARTUM | Facility: CLINIC | Age: 35
End: 2025-08-19

## 2025-08-20 ENCOUNTER — INPATIENT (INPATIENT)
Facility: HOSPITAL | Age: 35
LOS: 1 days | Discharge: HOME | End: 2025-08-22
Attending: SPECIALIST | Admitting: SPECIALIST
Payer: COMMERCIAL

## 2025-08-20 ENCOUNTER — APPOINTMENT (OUTPATIENT)
Facility: CLINIC | Age: 35
End: 2025-08-20
Payer: COMMERCIAL

## 2025-08-20 VITALS
WEIGHT: 175.05 LBS | HEART RATE: 73 BPM | DIASTOLIC BLOOD PRESSURE: 80 MMHG | HEIGHT: 62 IN | SYSTOLIC BLOOD PRESSURE: 119 MMHG | RESPIRATION RATE: 16 BRPM | TEMPERATURE: 98 F

## 2025-08-20 DIAGNOSIS — Z98.891 HISTORY OF UTERINE SCAR FROM PREVIOUS SURGERY: ICD-10-CM

## 2025-08-20 DIAGNOSIS — Z98.1 ARTHRODESIS STATUS: Chronic | ICD-10-CM

## 2025-08-20 DIAGNOSIS — Z98.890 OTHER SPECIFIED POSTPROCEDURAL STATES: Chronic | ICD-10-CM

## 2025-08-20 LAB
BASOPHILS # BLD AUTO: 0.03 K/UL — SIGNIFICANT CHANGE UP (ref 0–0.2)
BASOPHILS NFR BLD AUTO: 0.3 % — SIGNIFICANT CHANGE UP (ref 0–2)
BLD GP AB SCN SERPL QL: NEGATIVE — SIGNIFICANT CHANGE UP
EOSINOPHIL # BLD AUTO: 0.1 K/UL — SIGNIFICANT CHANGE UP (ref 0–0.5)
EOSINOPHIL NFR BLD AUTO: 0.8 % — SIGNIFICANT CHANGE UP (ref 0–6)
HCT VFR BLD CALC: 36.3 % — SIGNIFICANT CHANGE UP (ref 34.5–45)
HGB BLD-MCNC: 12.6 G/DL — SIGNIFICANT CHANGE UP (ref 11.5–15.5)
IMM GRANULOCYTES # BLD AUTO: 0.06 K/UL — SIGNIFICANT CHANGE UP (ref 0–0.07)
IMM GRANULOCYTES NFR BLD AUTO: 0.5 % — SIGNIFICANT CHANGE UP (ref 0–0.9)
LYMPHOCYTES # BLD AUTO: 1.87 K/UL — SIGNIFICANT CHANGE UP (ref 1–3.3)
LYMPHOCYTES NFR BLD AUTO: 15.8 % — SIGNIFICANT CHANGE UP (ref 13–44)
MCHC RBC-ENTMCNC: 32.2 PG — SIGNIFICANT CHANGE UP (ref 27–34)
MCHC RBC-ENTMCNC: 34.7 G/DL — SIGNIFICANT CHANGE UP (ref 32–36)
MCV RBC AUTO: 92.8 FL — SIGNIFICANT CHANGE UP (ref 80–100)
MONOCYTES # BLD AUTO: 0.69 K/UL — SIGNIFICANT CHANGE UP (ref 0–0.9)
MONOCYTES NFR BLD AUTO: 5.8 % — SIGNIFICANT CHANGE UP (ref 2–14)
NEUTROPHILS # BLD AUTO: 9.1 K/UL — HIGH (ref 1.8–7.4)
NEUTROPHILS NFR BLD AUTO: 76.8 % — SIGNIFICANT CHANGE UP (ref 43–77)
NRBC # BLD AUTO: 0 K/UL — SIGNIFICANT CHANGE UP (ref 0–0)
NRBC # FLD: 0 K/UL — SIGNIFICANT CHANGE UP (ref 0–0)
NRBC BLD AUTO-RTO: 0 /100 WBCS — SIGNIFICANT CHANGE UP (ref 0–0)
PLATELET # BLD AUTO: 222 K/UL — SIGNIFICANT CHANGE UP (ref 150–400)
PMV BLD: 11 FL — SIGNIFICANT CHANGE UP (ref 7–13)
RBC # BLD: 3.91 M/UL — SIGNIFICANT CHANGE UP (ref 3.8–5.2)
RBC # FLD: 12.9 % — SIGNIFICANT CHANGE UP (ref 10.3–14.5)
RH IG SCN BLD-IMP: POSITIVE — SIGNIFICANT CHANGE UP
WBC # BLD: 11.85 K/UL — HIGH (ref 3.8–10.5)
WBC # FLD AUTO: 11.85 K/UL — HIGH (ref 3.8–10.5)

## 2025-08-20 PROCEDURE — 0502F SUBSEQUENT PRENATAL CARE: CPT

## 2025-08-20 RX ORDER — CITRIC ACID/SODIUM CITRATE 300-500 MG
15 SOLUTION, ORAL ORAL EVERY 6 HOURS
Refills: 0 | Status: DISCONTINUED | OUTPATIENT
Start: 2025-08-20 | End: 2025-08-21

## 2025-08-20 RX ORDER — OXYTOCIN-SODIUM CHLORIDE 0.9% IV SOLN 30 UNIT/500ML 30-0.9/5 UT/ML-%
SOLUTION INTRAVENOUS
Qty: 30 | Refills: 0 | Status: DISCONTINUED | OUTPATIENT
Start: 2025-08-20 | End: 2025-08-21

## 2025-08-20 RX ORDER — SODIUM CHLORIDE 9 G/1000ML
1000 INJECTION, SOLUTION INTRAVENOUS
Refills: 0 | Status: DISCONTINUED | OUTPATIENT
Start: 2025-08-20 | End: 2025-08-21

## 2025-08-20 RX ORDER — OXYTOCIN-SODIUM CHLORIDE 0.9% IV SOLN 30 UNIT/500ML 30-0.9/5 UT/ML-%
167 SOLUTION INTRAVENOUS
Qty: 30 | Refills: 0 | Status: DISCONTINUED | OUTPATIENT
Start: 2025-08-20 | End: 2025-08-21

## 2025-08-20 RX ADMIN — OXYTOCIN-SODIUM CHLORIDE 0.9% IV SOLN 30 UNIT/500ML 2 MILLIUNIT(S)/MIN: 30-0.9/5 SOLUTION at 18:24

## 2025-08-20 RX ADMIN — SODIUM CHLORIDE 125 MILLILITER(S): 9 INJECTION, SOLUTION INTRAVENOUS at 18:24

## 2025-08-21 ENCOUNTER — TRANSCRIPTION ENCOUNTER (OUTPATIENT)
Age: 35
End: 2025-08-21

## 2025-08-21 LAB — T PALLIDUM AB TITR SER: NEGATIVE — SIGNIFICANT CHANGE UP

## 2025-08-21 RX ORDER — OXYCODONE HYDROCHLORIDE 30 MG/1
5 TABLET ORAL
Refills: 0 | Status: DISCONTINUED | OUTPATIENT
Start: 2025-08-21 | End: 2025-08-22

## 2025-08-21 RX ORDER — BENZOCAINE 220 MG/G
1 SPRAY, METERED PERIODONTAL EVERY 6 HOURS
Refills: 0 | Status: DISCONTINUED | OUTPATIENT
Start: 2025-08-21 | End: 2025-08-22

## 2025-08-21 RX ORDER — ACETAMINOPHEN 500 MG/5ML
975 LIQUID (ML) ORAL
Refills: 0 | Status: DISCONTINUED | OUTPATIENT
Start: 2025-08-21 | End: 2025-08-22

## 2025-08-21 RX ORDER — OXYCODONE HYDROCHLORIDE 30 MG/1
5 TABLET ORAL ONCE
Refills: 0 | Status: DISCONTINUED | OUTPATIENT
Start: 2025-08-21 | End: 2025-08-22

## 2025-08-21 RX ORDER — DIPHENHYDRAMINE HCL 12.5MG/5ML
25 ELIXIR ORAL EVERY 6 HOURS
Refills: 0 | Status: DISCONTINUED | OUTPATIENT
Start: 2025-08-21 | End: 2025-08-22

## 2025-08-21 RX ORDER — SIMETHICONE 80 MG
80 TABLET,CHEWABLE ORAL EVERY 4 HOURS
Refills: 0 | Status: DISCONTINUED | OUTPATIENT
Start: 2025-08-21 | End: 2025-08-22

## 2025-08-21 RX ORDER — PRENATAL 136/IRON/FOLIC ACID 27 MG-1 MG
1 TABLET ORAL DAILY
Refills: 0 | Status: DISCONTINUED | OUTPATIENT
Start: 2025-08-21 | End: 2025-08-22

## 2025-08-21 RX ORDER — PRAMOXINE HCL 1 %
1 GEL (GRAM) TOPICAL EVERY 4 HOURS
Refills: 0 | Status: DISCONTINUED | OUTPATIENT
Start: 2025-08-21 | End: 2025-08-22

## 2025-08-21 RX ORDER — IBUPROFEN 200 MG
600 TABLET ORAL EVERY 6 HOURS
Refills: 0 | Status: DISCONTINUED | OUTPATIENT
Start: 2025-08-21 | End: 2025-08-22

## 2025-08-21 RX ORDER — IBUPROFEN 200 MG
600 TABLET ORAL EVERY 6 HOURS
Refills: 0 | Status: COMPLETED | OUTPATIENT
Start: 2025-08-21 | End: 2026-07-20

## 2025-08-21 RX ORDER — OXYTOCIN-SODIUM CHLORIDE 0.9% IV SOLN 30 UNIT/500ML 30-0.9/5 UT/ML-%
167 SOLUTION INTRAVENOUS
Qty: 30 | Refills: 0 | Status: DISCONTINUED | OUTPATIENT
Start: 2025-08-21 | End: 2025-08-22

## 2025-08-21 RX ORDER — MAGNESIUM HYDROXIDE 400 MG/5ML
30 SUSPENSION ORAL
Refills: 0 | Status: DISCONTINUED | OUTPATIENT
Start: 2025-08-21 | End: 2025-08-22

## 2025-08-21 RX ORDER — MODIFIED LANOLIN 100 %
1 CREAM (GRAM) TOPICAL EVERY 6 HOURS
Refills: 0 | Status: DISCONTINUED | OUTPATIENT
Start: 2025-08-21 | End: 2025-08-22

## 2025-08-21 RX ORDER — HYDROCORTISONE 10 MG/G
1 CREAM TOPICAL EVERY 6 HOURS
Refills: 0 | Status: DISCONTINUED | OUTPATIENT
Start: 2025-08-21 | End: 2025-08-22

## 2025-08-21 RX ORDER — DIBUCAINE 10 MG/G
1 OINTMENT TOPICAL EVERY 6 HOURS
Refills: 0 | Status: DISCONTINUED | OUTPATIENT
Start: 2025-08-21 | End: 2025-08-22

## 2025-08-21 RX ORDER — KETOROLAC TROMETHAMINE 30 MG/ML
30 INJECTION, SOLUTION INTRAMUSCULAR; INTRAVENOUS ONCE
Refills: 0 | Status: DISCONTINUED | OUTPATIENT
Start: 2025-08-21 | End: 2025-08-21

## 2025-08-21 RX ORDER — IBUPROFEN 200 MG
1 TABLET ORAL
Qty: 0 | Refills: 0 | DISCHARGE
Start: 2025-08-21

## 2025-08-21 RX ORDER — WITCH HAZEL LEAF
1 FLUID EXTRACT MISCELLANEOUS EVERY 4 HOURS
Refills: 0 | Status: DISCONTINUED | OUTPATIENT
Start: 2025-08-21 | End: 2025-08-22

## 2025-08-21 RX ORDER — ACETAMINOPHEN 500 MG/5ML
3 LIQUID (ML) ORAL
Qty: 0 | Refills: 0 | DISCHARGE
Start: 2025-08-21

## 2025-08-21 RX ADMIN — OXYTOCIN-SODIUM CHLORIDE 0.9% IV SOLN 30 UNIT/500ML 167 MILLIUNIT(S)/MIN: 30-0.9/5 SOLUTION at 09:24

## 2025-08-21 RX ADMIN — Medication 3 MILLILITER(S): at 21:23

## 2025-08-21 RX ADMIN — Medication 600 MILLIGRAM(S): at 23:42

## 2025-08-21 RX ADMIN — Medication 125 MILLILITER(S): at 05:01

## 2025-08-21 RX ADMIN — KETOROLAC TROMETHAMINE 30 MILLIGRAM(S): 30 INJECTION, SOLUTION INTRAMUSCULAR; INTRAVENOUS at 09:50

## 2025-08-21 RX ADMIN — Medication 975 MILLIGRAM(S): at 16:25

## 2025-08-21 RX ADMIN — Medication 975 MILLIGRAM(S): at 21:23

## 2025-08-21 RX ADMIN — OXYTOCIN-SODIUM CHLORIDE 0.9% IV SOLN 30 UNIT/500ML 2 MILLIUNIT(S)/MIN: 30-0.9/5 SOLUTION at 05:01

## 2025-08-21 RX ADMIN — Medication 975 MILLIGRAM(S): at 17:25

## 2025-08-21 RX ADMIN — KETOROLAC TROMETHAMINE 30 MILLIGRAM(S): 30 INJECTION, SOLUTION INTRAMUSCULAR; INTRAVENOUS at 09:19

## 2025-08-21 RX ADMIN — Medication 975 MILLIGRAM(S): at 20:53

## 2025-08-21 RX ADMIN — Medication 600 MILLIGRAM(S): at 17:43

## 2025-08-21 RX ADMIN — Medication 600 MILLILITER(S): at 04:28

## 2025-08-22 VITALS
TEMPERATURE: 98 F | DIASTOLIC BLOOD PRESSURE: 85 MMHG | HEART RATE: 80 BPM | OXYGEN SATURATION: 99 % | RESPIRATION RATE: 18 BRPM | SYSTOLIC BLOOD PRESSURE: 114 MMHG

## 2025-08-22 LAB
HCT VFR BLD CALC: 34 % — LOW (ref 34.5–45)
HGB BLD-MCNC: 11.4 G/DL — LOW (ref 11.5–15.5)

## 2025-08-22 RX ORDER — PRENATAL 136/IRON/FOLIC ACID 27 MG-1 MG
1 TABLET ORAL
Qty: 0 | Refills: 0 | DISCHARGE
Start: 2025-08-22

## 2025-08-22 RX ADMIN — Medication 1 TABLET(S): at 11:59

## 2025-08-22 RX ADMIN — Medication 3 MILLILITER(S): at 06:18

## 2025-08-22 RX ADMIN — Medication 600 MILLIGRAM(S): at 12:30

## 2025-08-22 RX ADMIN — Medication 600 MILLIGRAM(S): at 06:18

## 2025-08-22 RX ADMIN — Medication 975 MILLIGRAM(S): at 09:05

## 2025-08-22 RX ADMIN — Medication 600 MILLIGRAM(S): at 06:48

## 2025-08-22 RX ADMIN — Medication 600 MILLIGRAM(S): at 00:12

## 2025-08-22 RX ADMIN — Medication 600 MILLIGRAM(S): at 11:59

## 2025-08-22 RX ADMIN — Medication 975 MILLIGRAM(S): at 08:35

## 2025-09-12 ENCOUNTER — NON-APPOINTMENT (OUTPATIENT)
Age: 35
End: 2025-09-12